# Patient Record
Sex: FEMALE | Race: WHITE | NOT HISPANIC OR LATINO | Employment: OTHER | URBAN - METROPOLITAN AREA
[De-identification: names, ages, dates, MRNs, and addresses within clinical notes are randomized per-mention and may not be internally consistent; named-entity substitution may affect disease eponyms.]

---

## 2017-04-04 ENCOUNTER — CONVERSION ENCOUNTER (OUTPATIENT)
Dept: GENERAL RADIOLOGY | Facility: HOSPITAL | Age: 62
End: 2017-04-04

## 2018-02-19 ENCOUNTER — OFFICE VISIT CONVERTED (OUTPATIENT)
Dept: FAMILY MEDICINE CLINIC | Facility: CLINIC | Age: 63
End: 2018-02-19
Attending: NURSE PRACTITIONER

## 2018-02-19 ENCOUNTER — CONVERSION ENCOUNTER (OUTPATIENT)
Dept: FAMILY MEDICINE CLINIC | Facility: CLINIC | Age: 63
End: 2018-02-19

## 2018-04-11 ENCOUNTER — OFFICE VISIT CONVERTED (OUTPATIENT)
Dept: ORTHOPEDIC SURGERY | Facility: CLINIC | Age: 63
End: 2018-04-11
Attending: ORTHOPAEDIC SURGERY

## 2018-05-16 ENCOUNTER — CONVERSION ENCOUNTER (OUTPATIENT)
Dept: FAMILY MEDICINE CLINIC | Facility: CLINIC | Age: 63
End: 2018-05-16

## 2018-05-16 ENCOUNTER — OFFICE VISIT CONVERTED (OUTPATIENT)
Dept: FAMILY MEDICINE CLINIC | Facility: CLINIC | Age: 63
End: 2018-05-16
Attending: NURSE PRACTITIONER

## 2018-07-10 ENCOUNTER — OFFICE VISIT CONVERTED (OUTPATIENT)
Dept: FAMILY MEDICINE CLINIC | Facility: CLINIC | Age: 63
End: 2018-07-10
Attending: NURSE PRACTITIONER

## 2019-01-02 ENCOUNTER — HOSPITAL ENCOUNTER (OUTPATIENT)
Dept: OTHER | Facility: HOSPITAL | Age: 64
Discharge: HOME | End: 2019-01-02
Attending: NURSE PRACTITIONER

## 2019-01-02 LAB
25(OH)D3 SERPL-MCNC: 36.3 NG/ML (ref 30–100)
ALBUMIN SERPL-MCNC: 4.5 G/DL (ref 3.5–5)
ALBUMIN/GLOB SERPL: 1.7 {RATIO} (ref 1.4–2.6)
ALP SERPL-CCNC: 82 U/L (ref 43–160)
ALT SERPL-CCNC: 24 U/L (ref 10–40)
ANION GAP SERPL CALC-SCNC: 21 MMOL/L (ref 8–19)
AST SERPL-CCNC: 19 U/L (ref 15–50)
BASOPHILS # BLD AUTO: 0.05 10*3/UL (ref 0–0.2)
BASOPHILS NFR BLD AUTO: 0.72 % (ref 0–3)
BILIRUB SERPL-MCNC: 0.24 MG/DL (ref 0.2–1.3)
BUN SERPL-MCNC: 18 MG/DL (ref 5–25)
BUN/CREAT SERPL: 19 {RATIO} (ref 6–20)
CALCIUM SERPL-MCNC: 9.6 MG/DL (ref 8.7–10.4)
CHLORIDE SERPL-SCNC: 106 MMOL/L (ref 99–111)
CHOLEST SERPL-MCNC: 175 MG/DL (ref 107–200)
CHOLEST/HDLC SERPL: 3.8 {RATIO} (ref 3–6)
CONV CO2: 23 MMOL/L (ref 22–32)
CONV TOTAL PROTEIN: 7.2 G/DL (ref 6.3–8.2)
CREAT UR-MCNC: 0.94 MG/DL (ref 0.5–0.9)
EOSINOPHIL # BLD AUTO: 0.21 10*3/UL (ref 0–0.7)
EOSINOPHIL # BLD AUTO: 3.29 % (ref 0–7)
ERYTHROCYTE [DISTWIDTH] IN BLOOD BY AUTOMATED COUNT: 11.9 % (ref 11.5–14.5)
EST. AVERAGE GLUCOSE BLD GHB EST-MCNC: 128 MG/DL
GFR SERPLBLD BASED ON 1.73 SQ M-ARVRAT: >60 ML/MIN/{1.73_M2}
GLOBULIN UR ELPH-MCNC: 2.7 G/DL (ref 2–3.5)
GLUCOSE SERPL-MCNC: 133 MG/DL (ref 65–99)
HBA1C MFR BLD: 15 G/DL (ref 12–16)
HBA1C MFR BLD: 6.1 % (ref 3.5–5.7)
HCT VFR BLD AUTO: 46.8 % (ref 37–47)
HDLC SERPL-MCNC: 46 MG/DL (ref 40–60)
LDLC SERPL CALC-MCNC: 106 MG/DL (ref 70–100)
LYMPHOCYTES # BLD AUTO: 2.11 10*3/UL (ref 1–5)
MCH RBC QN AUTO: 30.1 PG (ref 27–31)
MCHC RBC AUTO-ENTMCNC: 32 G/DL (ref 33–37)
MCV RBC AUTO: 94.3 FL (ref 81–99)
MONOCYTES # BLD AUTO: 0.43 10*3/UL (ref 0.2–1.2)
MONOCYTES NFR BLD AUTO: 6.9 % (ref 3–10)
NEUTROPHILS # BLD AUTO: 3.48 10*3/UL (ref 2–8)
NEUTROPHILS NFR BLD AUTO: 55.5 % (ref 30–85)
NRBC BLD AUTO-RTO: 0 % (ref 0–0.01)
OSMOLALITY SERPL CALC.SUM OF ELEC: 306 MOSM/KG (ref 273–304)
PLATELET # BLD AUTO: 278 10*3/UL (ref 130–400)
PMV BLD AUTO: 8.1 FL (ref 7.4–10.4)
POTASSIUM SERPL-SCNC: 4.2 MMOL/L (ref 3.5–5.3)
RBC # BLD AUTO: 4.96 10*6/UL (ref 4.2–5.4)
SODIUM SERPL-SCNC: 146 MMOL/L (ref 135–147)
T4 FREE SERPL-MCNC: 1.2 NG/DL (ref 0.9–1.8)
TRIGL SERPL-MCNC: 113 MG/DL (ref 40–150)
TSH SERPL-ACNC: 2.27 M[IU]/L (ref 0.27–4.2)
URATE SERPL-MCNC: 8.5 MG/DL (ref 2.5–7.5)
VARIANT LYMPHS NFR BLD MANUAL: 33.6 % (ref 20–45)
VLDLC SERPL-MCNC: 23 MG/DL (ref 5–37)
WBC # BLD AUTO: 6.26 10*3/UL (ref 4.8–10.8)

## 2019-01-03 ENCOUNTER — OFFICE VISIT CONVERTED (OUTPATIENT)
Dept: FAMILY MEDICINE CLINIC | Facility: CLINIC | Age: 64
End: 2019-01-03
Attending: NURSE PRACTITIONER

## 2019-01-03 ENCOUNTER — CONVERSION ENCOUNTER (OUTPATIENT)
Dept: FAMILY MEDICINE CLINIC | Facility: CLINIC | Age: 64
End: 2019-01-03

## 2019-01-31 ENCOUNTER — OFFICE VISIT CONVERTED (OUTPATIENT)
Dept: SURGERY | Facility: CLINIC | Age: 64
End: 2019-01-31
Attending: SURGERY

## 2019-02-11 ENCOUNTER — HOSPITAL ENCOUNTER (OUTPATIENT)
Dept: GASTROENTEROLOGY | Facility: HOSPITAL | Age: 64
Setting detail: HOSPITAL OUTPATIENT SURGERY
Discharge: HOME | End: 2019-02-11
Attending: SURGERY

## 2019-03-04 ENCOUNTER — OFFICE VISIT CONVERTED (OUTPATIENT)
Dept: SURGERY | Facility: CLINIC | Age: 64
End: 2019-03-04
Attending: SURGERY

## 2019-03-06 ENCOUNTER — HOSPITAL ENCOUNTER (OUTPATIENT)
Dept: GENERAL RADIOLOGY | Facility: HOSPITAL | Age: 64
Discharge: HOME | End: 2019-03-06
Attending: NURSE PRACTITIONER

## 2019-07-08 ENCOUNTER — HOSPITAL ENCOUNTER (OUTPATIENT)
Dept: FAMILY MEDICINE CLINIC | Facility: CLINIC | Age: 64
Discharge: HOME | End: 2019-07-08
Attending: NURSE PRACTITIONER

## 2019-07-08 ENCOUNTER — OFFICE VISIT CONVERTED (OUTPATIENT)
Dept: FAMILY MEDICINE CLINIC | Facility: CLINIC | Age: 64
End: 2019-07-08
Attending: NURSE PRACTITIONER

## 2019-07-08 LAB
CONV CREATININE URINE, RANDOM: 20.7 MG/DL (ref 10–300)
CONV MICROALBUM.,U,RANDOM: <12 MG/L (ref 0–20)
MICROALBUMIN/CREAT UR: 58 MG/G{CRE} (ref 0–35)
T4 FREE SERPL-MCNC: 1.2 NG/DL (ref 0.9–1.8)
TSH SERPL-ACNC: 1.42 M[IU]/L (ref 0.27–4.2)

## 2019-07-12 LAB
CONV ANTI MICROSOMAL AB: 12 IU/ML (ref 0–34)
THYROGLOBULIN ANTIBODY: <1 IU/ML (ref 0–0.9)

## 2019-07-16 ENCOUNTER — OFFICE VISIT CONVERTED (OUTPATIENT)
Dept: SURGERY | Facility: CLINIC | Age: 64
End: 2019-07-16
Attending: SURGERY

## 2019-07-16 ENCOUNTER — HOSPITAL ENCOUNTER (OUTPATIENT)
Dept: FAMILY MEDICINE CLINIC | Facility: CLINIC | Age: 64
Discharge: HOME | End: 2019-07-16
Attending: NURSE PRACTITIONER

## 2019-07-16 LAB
ALBUMIN SERPL-MCNC: 4 G/DL (ref 3.5–5)
ALBUMIN/GLOB SERPL: 1.5 {RATIO} (ref 1.4–2.6)
ALP SERPL-CCNC: 84 U/L (ref 43–160)
ALT SERPL-CCNC: 18 U/L (ref 10–40)
ANION GAP SERPL CALC-SCNC: 20 MMOL/L (ref 8–19)
AST SERPL-CCNC: 20 U/L (ref 15–50)
BASOPHILS # BLD AUTO: 0.03 10*3/UL (ref 0–0.2)
BASOPHILS NFR BLD AUTO: 0.6 % (ref 0–3)
BILIRUB SERPL-MCNC: 0.28 MG/DL (ref 0.2–1.3)
BUN SERPL-MCNC: 21 MG/DL (ref 5–25)
BUN/CREAT SERPL: 23 {RATIO} (ref 6–20)
CALCIUM SERPL-MCNC: 9.3 MG/DL (ref 8.7–10.4)
CHLORIDE SERPL-SCNC: 103 MMOL/L (ref 99–111)
CONV ABS IMM GRAN: 0.01 10*3/UL (ref 0–0.2)
CONV CO2: 24 MMOL/L (ref 22–32)
CONV IMMATURE GRAN: 0.2 % (ref 0–1.8)
CONV TOTAL PROTEIN: 6.6 G/DL (ref 6.3–8.2)
CREAT UR-MCNC: 0.92 MG/DL (ref 0.5–0.9)
DEPRECATED RDW RBC AUTO: 45.7 FL (ref 36.4–46.3)
EOSINOPHIL # BLD AUTO: 0.14 10*3/UL (ref 0–0.7)
EOSINOPHIL # BLD AUTO: 2.6 % (ref 0–7)
ERYTHROCYTE [DISTWIDTH] IN BLOOD BY AUTOMATED COUNT: 13.5 % (ref 11.7–14.4)
EST. AVERAGE GLUCOSE BLD GHB EST-MCNC: 134 MG/DL
GFR SERPLBLD BASED ON 1.73 SQ M-ARVRAT: >60 ML/MIN/{1.73_M2}
GLOBULIN UR ELPH-MCNC: 2.6 G/DL (ref 2–3.5)
GLUCOSE SERPL-MCNC: 106 MG/DL (ref 65–99)
HBA1C MFR BLD: 13.9 G/DL (ref 12–16)
HBA1C MFR BLD: 6.3 % (ref 3.5–5.7)
HCT VFR BLD AUTO: 44 % (ref 37–47)
LYMPHOCYTES # BLD AUTO: 1.49 10*3/UL (ref 1–5)
MCH RBC QN AUTO: 29.4 PG (ref 27–31)
MCHC RBC AUTO-ENTMCNC: 31.6 G/DL (ref 33–37)
MCV RBC AUTO: 93 FL (ref 81–99)
MONOCYTES # BLD AUTO: 0.37 10*3/UL (ref 0.2–1.2)
MONOCYTES NFR BLD AUTO: 7 % (ref 3–10)
NEUTROPHILS # BLD AUTO: 3.25 10*3/UL (ref 2–8)
NEUTROPHILS NFR BLD AUTO: 61.4 % (ref 30–85)
NRBC CBCN: 0 % (ref 0–0.7)
OSMOLALITY SERPL CALC.SUM OF ELEC: 297 MOSM/KG (ref 273–304)
PLATELET # BLD AUTO: 265 10*3/UL (ref 130–400)
PMV BLD AUTO: 11 FL (ref 9.4–12.3)
POTASSIUM SERPL-SCNC: 4.7 MMOL/L (ref 3.5–5.3)
RBC # BLD AUTO: 4.73 10*6/UL (ref 4.2–5.4)
SODIUM SERPL-SCNC: 142 MMOL/L (ref 135–147)
VARIANT LYMPHS NFR BLD MANUAL: 28.2 % (ref 20–45)
WBC # BLD AUTO: 5.29 10*3/UL (ref 4.8–10.8)

## 2019-08-14 ENCOUNTER — HOSPITAL ENCOUNTER (OUTPATIENT)
Dept: GASTROENTEROLOGY | Facility: HOSPITAL | Age: 64
Setting detail: HOSPITAL OUTPATIENT SURGERY
Discharge: HOME | End: 2019-08-14
Attending: SURGERY

## 2019-08-14 LAB — GLUCOSE BLD-MCNC: 126 MG/DL (ref 65–99)

## 2019-12-09 ENCOUNTER — HOSPITAL ENCOUNTER (OUTPATIENT)
Dept: FAMILY MEDICINE CLINIC | Facility: CLINIC | Age: 64
Discharge: HOME | End: 2019-12-09
Attending: NURSE PRACTITIONER

## 2019-12-09 ENCOUNTER — OFFICE VISIT CONVERTED (OUTPATIENT)
Dept: FAMILY MEDICINE CLINIC | Facility: CLINIC | Age: 64
End: 2019-12-09
Attending: NURSE PRACTITIONER

## 2019-12-09 LAB
BASOPHILS # BLD AUTO: 0.06 10*3/UL (ref 0–0.2)
BASOPHILS NFR BLD AUTO: 0.7 % (ref 0–3)
CONV ABS IMM GRAN: 0.02 10*3/UL (ref 0–0.2)
CONV IMMATURE GRAN: 0.2 % (ref 0–1.8)
DEPRECATED RDW RBC AUTO: 43.4 FL (ref 36.4–46.3)
EOSINOPHIL # BLD AUTO: 0.23 10*3/UL (ref 0–0.7)
EOSINOPHIL # BLD AUTO: 2.6 % (ref 0–7)
ERYTHROCYTE [DISTWIDTH] IN BLOOD BY AUTOMATED COUNT: 12.9 % (ref 11.7–14.4)
HCT VFR BLD AUTO: 45.3 % (ref 37–47)
HGB BLD-MCNC: 14.2 G/DL (ref 12–16)
LYMPHOCYTES # BLD AUTO: 1.3 10*3/UL (ref 1–5)
LYMPHOCYTES NFR BLD AUTO: 14.5 % (ref 20–45)
MCH RBC QN AUTO: 28.7 PG (ref 27–31)
MCHC RBC AUTO-ENTMCNC: 31.3 G/DL (ref 33–37)
MCV RBC AUTO: 91.7 FL (ref 81–99)
MONOCYTES # BLD AUTO: 0.76 10*3/UL (ref 0.2–1.2)
MONOCYTES NFR BLD AUTO: 8.5 % (ref 3–10)
NEUTROPHILS # BLD AUTO: 6.59 10*3/UL (ref 2–8)
NEUTROPHILS NFR BLD AUTO: 73.5 % (ref 30–85)
NRBC CBCN: 0 % (ref 0–0.7)
PLATELET # BLD AUTO: 244 10*3/UL (ref 130–400)
PMV BLD AUTO: 10.6 FL (ref 9.4–12.3)
RBC # BLD AUTO: 4.94 10*6/UL (ref 4.2–5.4)
WBC # BLD AUTO: 8.96 10*3/UL (ref 4.8–10.8)

## 2020-04-01 ENCOUNTER — CONVERSION ENCOUNTER (OUTPATIENT)
Dept: FAMILY MEDICINE CLINIC | Facility: CLINIC | Age: 65
End: 2020-04-01

## 2020-09-01 ENCOUNTER — HOSPITAL ENCOUNTER (OUTPATIENT)
Dept: FAMILY MEDICINE CLINIC | Facility: CLINIC | Age: 65
Discharge: HOME | End: 2020-09-01
Attending: NURSE PRACTITIONER

## 2020-09-01 ENCOUNTER — OFFICE VISIT CONVERTED (OUTPATIENT)
Dept: FAMILY MEDICINE CLINIC | Facility: CLINIC | Age: 65
End: 2020-09-01
Attending: NURSE PRACTITIONER

## 2020-09-01 LAB
EST. AVERAGE GLUCOSE BLD GHB EST-MCNC: 143 MG/DL
HBA1C MFR BLD: 6.6 % (ref 3.5–5.7)

## 2020-09-02 LAB
ALBUMIN SERPL-MCNC: 4.6 G/DL (ref 3.5–5)
ALBUMIN/GLOB SERPL: 1.5 {RATIO} (ref 1.4–2.6)
ALP SERPL-CCNC: 115 U/L (ref 43–160)
ALT SERPL-CCNC: 29 U/L (ref 10–40)
ANION GAP SERPL CALC-SCNC: 18 MMOL/L (ref 8–19)
AST SERPL-CCNC: 23 U/L (ref 15–50)
BASOPHILS # BLD AUTO: 0.04 10*3/UL (ref 0–0.2)
BASOPHILS NFR BLD AUTO: 0.7 % (ref 0–3)
BILIRUB SERPL-MCNC: 0.25 MG/DL (ref 0.2–1.3)
BUN SERPL-MCNC: 21 MG/DL (ref 5–25)
BUN/CREAT SERPL: 22 {RATIO} (ref 6–20)
CALCIUM SERPL-MCNC: 10.2 MG/DL (ref 8.7–10.4)
CHLORIDE SERPL-SCNC: 103 MMOL/L (ref 99–111)
CHOLEST SERPL-MCNC: 186 MG/DL (ref 107–200)
CHOLEST/HDLC SERPL: 4 {RATIO} (ref 3–6)
CONV ABS IMM GRAN: 0.02 10*3/UL (ref 0–0.2)
CONV CO2: 24 MMOL/L (ref 22–32)
CONV IMMATURE GRAN: 0.3 % (ref 0–1.8)
CONV TOTAL PROTEIN: 7.6 G/DL (ref 6.3–8.2)
CREAT UR-MCNC: 0.95 MG/DL (ref 0.5–0.9)
DEPRECATED RDW RBC AUTO: 45.8 FL (ref 36.4–46.3)
EOSINOPHIL # BLD AUTO: 0.19 10*3/UL (ref 0–0.7)
EOSINOPHIL # BLD AUTO: 3.2 % (ref 0–7)
ERYTHROCYTE [DISTWIDTH] IN BLOOD BY AUTOMATED COUNT: 13.5 % (ref 11.7–14.4)
GFR SERPLBLD BASED ON 1.73 SQ M-ARVRAT: >60 ML/MIN/{1.73_M2}
GLOBULIN UR ELPH-MCNC: 3 G/DL (ref 2–3.5)
GLUCOSE SERPL-MCNC: 113 MG/DL (ref 65–99)
HCT VFR BLD AUTO: 49.2 % (ref 37–47)
HDLC SERPL-MCNC: 46 MG/DL (ref 40–60)
HGB BLD-MCNC: 15.3 G/DL (ref 12–16)
LDLC SERPL CALC-MCNC: 114 MG/DL (ref 70–100)
LYMPHOCYTES # BLD AUTO: 1.93 10*3/UL (ref 1–5)
LYMPHOCYTES NFR BLD AUTO: 33 % (ref 20–45)
MCH RBC QN AUTO: 28.5 PG (ref 27–31)
MCHC RBC AUTO-ENTMCNC: 31.1 G/DL (ref 33–37)
MCV RBC AUTO: 91.6 FL (ref 81–99)
MONOCYTES # BLD AUTO: 0.41 10*3/UL (ref 0.2–1.2)
MONOCYTES NFR BLD AUTO: 7 % (ref 3–10)
NEUTROPHILS # BLD AUTO: 3.26 10*3/UL (ref 2–8)
NEUTROPHILS NFR BLD AUTO: 55.8 % (ref 30–85)
NRBC CBCN: 0 % (ref 0–0.7)
OSMOLALITY SERPL CALC.SUM OF ELEC: 294 MOSM/KG (ref 273–304)
PLATELET # BLD AUTO: 298 10*3/UL (ref 130–400)
PMV BLD AUTO: 10.7 FL (ref 9.4–12.3)
POTASSIUM SERPL-SCNC: 4.7 MMOL/L (ref 3.5–5.3)
RBC # BLD AUTO: 5.37 10*6/UL (ref 4.2–5.4)
SODIUM SERPL-SCNC: 140 MMOL/L (ref 135–147)
TRIGL SERPL-MCNC: 131 MG/DL (ref 40–150)
TSH SERPL-ACNC: 1 M[IU]/L (ref 0.27–4.2)
VLDLC SERPL-MCNC: 26 MG/DL (ref 5–37)
WBC # BLD AUTO: 5.85 10*3/UL (ref 4.8–10.8)

## 2020-09-10 ENCOUNTER — TELEPHONE CONVERTED (OUTPATIENT)
Dept: FAMILY MEDICINE CLINIC | Facility: CLINIC | Age: 65
End: 2020-09-10
Attending: NURSE PRACTITIONER

## 2020-10-07 ENCOUNTER — HOSPITAL ENCOUNTER (OUTPATIENT)
Dept: URGENT CARE | Facility: CLINIC | Age: 65
Discharge: HOME | End: 2020-10-07
Attending: EMERGENCY MEDICINE

## 2020-12-23 ENCOUNTER — TELEMEDICINE CONVERTED (OUTPATIENT)
Dept: FAMILY MEDICINE CLINIC | Facility: CLINIC | Age: 65
End: 2020-12-23
Attending: NURSE PRACTITIONER

## 2021-05-13 NOTE — PROGRESS NOTES
"   Progress Note      Patient Name: Abdiaziz Krishna   Patient ID: [id number]   Sex: Female   YOB: 1955    Primary Care Provider: Anahi SINGLETARY   Referring Provider: Anahi SINGLETARY    Visit Date: September 1, 2020    Provider: GEMINI Camarena   Location: [facility]   Location Address: [address] [place] [address]  [id number]   Location Phone: [phone]          Chief Complaint  · Lab work   · Anxiety/depression      History Of Present Illness  Abdiaziz Krishna is a 65 year old /White female who presents for evaluation and treatment of:        anxiety and depression, she is on hydroxyzine, she had nausea with the paxil. She couldn't tolerate trintellix for the nausea. She feels panicky and irritiable when she is in public places. She is not feeling herself. Its been worse since covid started. She has had thoughts that she would be better off if she weren't here, but has never come up with a plan. She feels bad for her family that they have to \"put up with her.\"     She scores a 24 on the phq-9.    She declines mammo  She declines bone density.    She had colonoscopy in 2019 and was cleared for 10 years.    She denies any recent falls.    She went to complete family care in [place] a few weeks ago for ear pain.  They told her to take an antihistamine, she still feels fullness especially in the right ear       Past Medical History  Disease Name Date Onset Notes   Allergic rhinitis --  --    Arthritis --  --    Asthma --  --    Broken Bones 1999,2013 07/29/2015 - left ankle and right leg    Bronchitis --  --    Depression --  --    Diabetes --  --    Diabetes Mellitus, Type II --  --    Gall Stones 1998 07/29/2015 -    Hyperlipidemia --  --    Hypertension --  --    Incomplete tear of left rotator cuff 04/13/2018 --    Otitis Media, Unspecified --  --    Plantar Fasciitis --  --    Reflux --  --    Reflux Disease --  --    Seasonal " allergies --  --    Sinusitis, acute --  --    Tendinopathy of rotator cuff, left 2018 --    Thyroid disorder --  --    Thyroid Problems 1978 2015 -          Past Surgical History  Procedure Name Date Notes   Ankle surgery  Left ankle fracture   Breast augmentation surgery, bilateral --  --    Cholecystectomy  --    Colonoscopy --  --    Fracture/Broken Bone(s)  Right leg fracture   Hysterectomy  --    Leg Surgery --  --    Metal implants --  --    Partial thyroidectomy  goiter 85% removed   Tubal ligation  --          Medication List  Name Date Started Instructions   hydroxyzine HCl 50 mg oral tablet 2020 one three times a day prn   Saxenda 3 mg/0.5 mL (18 mg/3 mL) subcutaneous pen injector 2020 inject 3 mg by subcutaneous route once daily in the abdomen, thigh, or upper arm         Allergy List  Allergen Name Date Reaction Notes   NO KNOWN DRUG ALLERGIES --  --  --        Allergies Reconciled  Family Medical History  Disease Name Relative/Age Notes   Emphysema Mother/   --    Heart Disease Father/   Father   Congestive Heart Failure Mother/    80yrs old   Diabetes, unspecified type Daughter/  Father/   Father; Daughter   Aneurysm Father/    age 88 abdominal aneurysm   Family history of certain chronic disabling diseases; arthritis Mother/   Mother         Reproductive History  Menstrual   Pregnancy Summary   Total Pregnancies: 2 Full Term: 0 Premature: 0   Ab Induced: 0 Ab Spontaneous: 0 Ectopics: 0   Multiples: 0 Livin         Social History  Finding Status Start/Stop Quantity Notes   Alcohol Former --/-- --  2015 - was heavy social drinker for years,now rare for last few years    Alcohol Use Never --/-- --  does not drink   Cook --  --/-- --  2015 - [employer]   Exercises regularly --  --/-- --  walks   lives with spouse --  --/-- --  --     --  --/-- --  --    . --  --/-- --  --    Recreational Drug Use Never  "--/-- --  no   Tobacco Former --/-- 2ppd used 35-40yrs quit 4 yrs ago   Working --  --/-- --  --          Immunizations  NameDate Admin Mfg Trade Name Lot Number Route Inj VIS Given VIS Publication   Hepatitis A10/01/2018 SKB HAVRIX-ADULT  [facility] 01/03/2019 10/25/2011   Comments:    Hepatitis A04/01/2018 SK HAVRIX-ADULT  [facility] 01/03/2019 10/25/2011   Comments:    Ygbvpfqzd64/01/2018 SKB Fluarix, quadrivalent, preservative free 2A2KX [facility] 01/03/2019 08/07/2015   Comments:    Nzuhfrzib34/25/2015 SKB Fluarix, quadrivalent, preservative free 2A2KX [facility] 10/19/2015 07/02/2012   Comments:          Review of Systems  · Constitutional  o Admits  o : fatigue  · HENT  o Admits  o : ear fullness, sinus congestion  · Cardiovascular  o Denies  o : lower extremity edema, chest pressure, palpitations  · Respiratory  o Denies  o : shortness of breath, wheezing, cough, dyspnea on exertion  · Gastrointestinal  o Denies  o : vomiting, diarrhea, constipation, abdominal pain  · Psychiatric  o Admits  o : anxiety, depression  o Denies  o : suicidal ideation, homicidal ideation      Vitals  Date Time BP Position Site L\R Cuff Size HR RR TEMP (F) WT  HT  BMI kg/m2 BSA m2 O2 Sat        04/01/2020 09:59 /79 Sitting    86 - R   226lbs 2oz 5'  9\" 33.39 2.23 96 %    04/01/2020 10:00 /74 Sitting               09/01/2020 10:32 /75 Sitting    82 - R 20 97.2 221lbs 4oz 5'  9\" 32.67 2.21 96 %          Physical Examination  · Constitutional  o Appearance  o : well developed, well-nourished, no acute distress, ill appearing  · Ears, Nose, Mouth and Throat  o Ears  o :   § External Ears  § : external auditory canal appearance normal, no discharge present  § Otoscopic Examination  § : tympanic membranes bulging  o Nose  o :   § External Nose  § : no lesions noted  § Intranasal Exam  § : nasal mucosa edematous  § Nasopharynx  § : no discharge present  o Oral Cavity  o :   § Oral Mucosa  § : oral mucosa light pink  o Throat  o : "   § Oropharynx  § : tonsils without exudate, no palatal petechiae  · Neck  o Inspection/Palpation  o : normal appearance, no masses or tenderness, trachea midline  o Thyroid  o : gland size normal, nontender, no nodules or masses present on palpation  · Respiratory  o Respiratory Effort  o : breathing unlabored  o Inspection of Chest  o : chest rise symmetric bilaterally  o Auscultation of Lungs  o : clear to auscultation bilaterally throughout inspiration and expiration  · Cardiovascular  o Heart  o :   § Auscultation of Heart  § : regular rate and rhythm, no murmurs, gallops or rubs  o Peripheral Vascular System  o :   § Extremities  § : no edema  · Lymphatic  o Neck  o : no cervical lymphadenopathy, no supraclavicular lymphadenopathy  · Psychiatric  o Mood and Affect  o : mood normal, affect appropriate  o Presence of Abnormal Thoughts  o : no hallucinations, no homicidal ideation, no suicidal ideation              Assessment  · Allergic rhinitis due to allergen     477.9/J30.9  · Anxiety disorder     300.00/F41.9  · Dermatitis     692.9/L30.9  · GERD (gastroesophageal reflux disease)     530.81/K21.9  · Screening for depression     V79.0/Z13.89  · ETD (eustachian tube dysfunction)     381.81/H69.80      Plan  · Orders  o Annual depression screening, 15 minutes (02168, ) - V79.0/Z13.89 - 09/01/2020  o ACO-18: Positive screen for clinical depression using a standardized tool and a follow-up plan documented () - V79.0/Z13.89 - 09/01/2020  o Hgb A1c [facility] (42851) - - 09/01/2020  o Physical, Primary Care Panel (CBC, CMP, Lipid, TSH) [facility] (63095, 86949, 13795, 06484) - - 09/01/2020  o ACO-39: Current medications updated and reviewed () - - 09/01/2020  o ACO-14: Influenza immunization was not administered for reasons documented () - - 09/01/2020  o ACO-19: Colorectal cancer screening results documented and reviewed (3017F) - - 09/01/2020 2/11/2019  o ACO-13: Fall Risk Screening with no falls in past  "year or only one fall without injury in the past year (1101F) - - 09/01/2020  o ACO - Pt declines to or was not able to provide an Advance Care Plan or name a Surrogate Decision Maker (1124F) - - 09/01/2020  · Medications  o Cymbalta 30 mg oral capsule,delayed release(DR/EC)   SIG: take 1 capsule (30 mg) by oral route once daily for 30 days   DISP: (30) capsules with 2 refills  Prescribed on 09/01/2020     o Flonase Allergy Relief 50 mcg/actuation nasal spray,suspension   SIG: spray 2 sprays (100 mcg) in each nostril by intranasal route once daily as needed   DISP: (1) 9.9 ml aer w/adap with 2 refills  Prescribed on 09/01/2020     o Medications have been Reconciled  o Transition of Care or Provider Policy  · Instructions  o Discussed the need for therapy, either with a certified counselor, psychologist, and/or family . If no improvement is noted or worsening of their condition, return to office or ER. But also discussed with patient that if they are non-responsive to the type of medication they may need to see a psychiatrist for further evaluation and management.  o Patient was given an SSRI/SSNRI medication and warned of possible side effects of the medication including potential for increased risk of suicidal thoughts and feelings. Patient was instructed that if they begin to exhibit any of these effects they will discontinue the medication immediately and contact our office or the ER ASAP.  o Patient agrees to a \"No Self Harm\" contract. Patient will either call us, [facility], ER, [facility], [facility].  o Maintain a healthy weight. Avoid tight fitting clothes. Avoid fried, fatty foods, tomato sauce, chocolate, mint, garlic, onion, alcohol. caffeine. Eat smaller meals, dont lie down after a meal, dont smoke. Elevate the head of your bed 6-9 inches.  o Depression Screen completed and scanned into the EMR under the designated folder within the patient's documents.  o Today's PHQ-9 " result is _24__  o The provider screening met the required time of 15 minutes.  o Take all medications as prescribed/directed.  o Rest. Increase Fluids.  o Patient was educated/instructed on their diagnosis, treatment and medications prior to discharge from the clinic today.  o Patient instructed to seek medical attention urgently for new or worsening symptoms.  o Call the office with any concerns or questions.  o Minutes spent with patient including greater than 50% in Education/Counseling/Care Coordination.  o Time spent with the patient was minutes, more than 50% face to face.  o Chronic conditions reviewed and taken into consideration for today's treatment plan.  o phone f/u in 6 weeks, sooner if needed  · Disposition  o Call or Return if symptoms worsen or persist.  o F/U 6 weeks            Electronically Signed by: Jackie Arteaga APRN -Author on September 1, 2020 01:16:16 PM

## 2021-05-13 NOTE — PROGRESS NOTES
Quick Note      Patient Name: Abdiaziz Krishna   Patient ID: [id number]   Sex: Female   YOB: 1955    Primary Care Provider: Anahi SINGLETARY   Referring Provider: Anahi SINGLETARY    Visit Date: September 10, 2020    Provider: GEMINI Camarena   Location: [facility]   Location Address: [address] [place] [address]  [id number]   Location Phone: [phone]          History Of Present Illness  TELEHEALTH TELEPHONE VISIT  Abdiaziz Krishna is a 65 year old /White female who is presenting for evaluation via telehealth telephone visit. Verbal consent obtained before beginning visit.   Provider spent 11 minutes with patient during telehealth visit.   The following staff were present during this visit: AT/CMA   Past Medical History/Overview of Patient Symptoms  Abdiaziz Krishna is a 65 year old /White female who presents for evaluation and treatment of:      PHONE CALL SATRTED AT 2:32  Discuss lab results    type 2 dm, she has been on metformin before, she is currently trying to lose weight. Her aic is up to 6.6. She hasn't lost a significant amt of weight since starting the saxenda. She is on a keto diet already.     HLP- LDL is 114 but total was normal.     Anxiety/depression, she has been on cymbalta for a week, she has had some nausea but otherwise is tolerating ok. She can't see a big difference either way.           Assessment  · Diabetes mellitus, type 2     250.00/E11.9  · Hyperlipidemia     272.4/E78.5  · Obesity     278.00/E66.9  · Anxiety and depression       Anxiety disorder, unspecified     300.00/F41.9  Major depressive disorder, single episode, unspecified     300.00/F32.9    Problems Reconciled  Plan  · Orders  o CMP [facility] (42808) - - 12/10/2020  o Hgb A1c [facility] (56094) - - 12/10/2020  o Lipid Panel [facility] (64575) - - 12/10/2020  o ACO-39: Current medications updated and reviewed () - - 09/10/2020  o ACO-14: Influenza  immunization administered or previously received () - - 09/10/2020  o Physician Telephone Evaluation, 11-20 minutes (00147) - - 09/10/2020  · Medications  o Medications have been Reconciled  o Transition of Care or Provider Policy  · Instructions  o Daily foot care. Avoid walking barefoot. Annual Dilated Eye Exam.  o Discussed with patient blood pressure monitoring, hemoglobin A1C levels need to be below 7.0, and LDL (Lipid) goals below 70.  o Advised that cheeses and other sources of dairy fats, animal fats, fast food, and the extras (candy, pastries, pies, doughnuts and cookies) all contain LDL raising nutrients. Advised to increase fruits, vegetables, whole grains, and to monitor portion sizes.   o Plan Of Care: she will call back to schedule appt and will do labs in 3 months to re eval. she is going ot work on weight loss in the meantime.   o Take all medications as prescribed/directed.  o Call the office with any concerns or questions.  · Disposition  o Call or Return if symptoms worsen or persist.  o F/u appt in 3 months            Electronically Signed by: GEMINI Camarena -Author on September 10, 2020 03:00:35 PM

## 2021-05-14 VITALS
SYSTOLIC BLOOD PRESSURE: 135 MMHG | HEART RATE: 82 BPM | DIASTOLIC BLOOD PRESSURE: 75 MMHG | TEMPERATURE: 97.2 F | HEIGHT: 69 IN | RESPIRATION RATE: 20 BRPM | BODY MASS INDEX: 32.77 KG/M2 | OXYGEN SATURATION: 96 % | WEIGHT: 221.25 LBS

## 2021-05-14 NOTE — PROGRESS NOTES
"   Progress Note      Patient Name: Abdiaziz Krishna   Patient ID: [id number]   Sex: Female   YOB: 1955    Primary Care Provider: Anahi SINGLETARY   Referring Provider: Anahi SINGLETARY    Visit Date: December 23, 2020    Provider: GEMINI James   Location: [facility]   Location Address: [address] [place] [address]  [id number]   Location Phone: [phone]          History Of Present Illness  Video Conferencing Visit  Abdiaziz Krishna is a 65 year old /White female who is presenting for evaluation via video conferencing via [employer]. Verbal consent obtained before beginning visit.   The following staff were present during this visit: pt, kCW   Abdiaziz Krishna is a 65 year old /White female who presents for evaluation and treatment of:      She has had a sore throat for about 1 week, achy ears.  No fever.  She has had headache that she normally doesn't have.  She has antihistamine and nasal spray.  She has not been around anyone with covid.  She feels she had covid a year ago.  She is not having any coughing it is just \"in my head pressure\".       Past Medical History  Disease Name Date Onset Notes   Allergic rhinitis --  --    Arthritis --  --    Asthma --  --    Broken Bones 1999,2013 07/29/2015 - left ankle and right leg    Bronchitis --  --    Depression --  --    Diabetes --  --    Diabetes Mellitus, Type II --  --    Gall Stones 1998 07/29/2015 -    Hyperlipidemia --  --    Hypertension --  --    Incomplete tear of left rotator cuff 04/13/2018 --    Otitis Media, Unspecified --  --    Plantar Fasciitis --  --    Reflux --  --    Reflux Disease --  --    Seasonal allergies --  --    Sinusitis, acute --  --    Tendinopathy of rotator cuff, left 04/13/2018 --    Thyroid disorder --  --    Thyroid Problems 1978 07/29/2015 -          Past Surgical History  Procedure Name Date Notes   Ankle surgery 2012 Left ankle fracture "   Breast augmentation surgery, bilateral --  --    Cholecystectomy  --    Colonoscopy --  --    Fracture/Broken Bone(s)  Right leg fracture   Hysterectomy  --    Leg Surgery --  --    Metal implants --  --    Partial thyroidectomy  goiter 85% removed   Tubal ligation  --          Medication List  Name Date Started Instructions   Cymbalta 30 mg oral capsule,delayed release(DR/EC) 2020 take 1 capsule (30 mg) by oral route once daily for 30 days   Flonase Allergy Relief 50 mcg/actuation nasal spray,suspension 2020 spray 2 sprays (100 mcg) in each nostril by intranasal route once daily as needed   hydroxyzine HCl 50 mg oral tablet 2020 one three times a day prn   Saxenda 3 mg/0.5 mL (18 mg/3 mL) subcutaneous pen injector 2020 inject 3 mg by subcutaneous route once daily in the abdomen, thigh, or upper arm         Allergy List  Allergen Name Date Reaction Notes   NO KNOWN DRUG ALLERGIES --  --  --        Allergies Reconciled  Family Medical History  Disease Name Relative/Age Notes   Emphysema Mother/   --    Heart Disease Father/   Father   Congestive Heart Failure Mother/    80yrs old   Diabetes, unspecified type Daughter/  Father/   Father; Daughter   Aneurysm Father/    age 88 abdominal aneurysm   Family history of certain chronic disabling diseases; arthritis Mother/   Mother         Reproductive History  Menstrual   Pregnancy Summary   Total Pregnancies: 2 Full Term: 0 Premature: 0   Ab Induced: 0 Ab Spontaneous: 0 Ectopics: 0   Multiples: 0 Livin         Social History  Finding Status Start/Stop Quantity Notes   Alcohol Former --/-- --  2015 - was heavy social drinker for years,now rare for last few years    Alcohol Use Never --/-- --  does not drink   Cook --  --/-- --  2015 - [employer]   Exercises regularly --  --/-- --  walks   lives with spouse --  --/-- --  --     --  --/-- --  --    . --  --/-- --  --     Recreational Drug Use Never --/-- --  no   Tobacco Former --/-- 2ppd used 35-40yrs quit 4 yrs ago   Working --  --/-- --  --          Immunizations  NameDate Admin Mfg Trade Name Lot Number Route Inj VIS Given VIS Publication   Hepatitis A10/01/2018 SKB HAVRIX-ADULT  [facility] 01/03/2019 10/25/2011   Comments:    Hepatitis A04/01/2018 [facility] HAVRIX-ADULT  [facility] 01/03/2019 10/25/2011   Comments:    Ovyelnctd62/01/2018 [facility] Fluarix, quadrivalent, preservative free 2A2KX [facility] 01/03/2019 08/07/2015   Comments:          Review of Systems  · Constitutional  o Denies  o : fever, fatigue, weight loss, weight gain  · HENT  o Admits  o : headaches, nasal congestion, sore throat  · Cardiovascular  o Denies  o : lower extremity edema, claudication, chest pressure, palpitations  · Respiratory  o Denies  o : shortness of breath, wheezing, cough, hemoptysis, dyspnea on exertion  · Gastrointestinal  o Denies  o : nausea, vomiting, diarrhea, constipation, abdominal pain      Physical Examination  · Constitutional  o Appearance  o : well-nourished, well developed, alert, in no acute distress  · Respiratory  o Respiratory Effort  o : breathing unlabored  · Skin and Subcutaneous Tissue  o General Inspection  o : no rashes present, no lesions present, no areas of discoloration  · Neurologic  o Mental Status Examination  o :   § Orientation  § : grossly oriented to person, place and time  o Cranial Nerves  o : cranial nerves intact and symmetric throughout  · Psychiatric  o Mood and Affect  o : mood normal, affect appropriate, denies any SI/HI          Assessment  · URI (upper respiratory infection)     465.9/J06.9  · Sore throat     462/J02.9      Plan  · Orders  o ACO-39: Current medications updated and reviewed (1159F, ) - - 12/23/2020  o ACO-14: Influenza immunization administered or previously received [facility] () - - 12/23/2020   pharmacy  · Medications  o Protonix 40 mg oral tablet,delayed release (DR/EC)   SIG: take 1 tablet (40  mg) by oral route once daily   DISP: (1) Tablet with 0 refills  Prescribed on 2020     o prednisone 20 mg oral tablet   SI tab po TID for 3 days1 tab po BID for 3 days1 tab po Qday for 3 days   DISP: (18) Tablet with 0 refills  Prescribed on 2020     o Zithromax Z-Adrien 250 mg oral tablet   SIG: take 2 tablets (500 mg) by oral route once daily for 1 day then 1 tablet (250 mg) by oral route once daily for 4 days   DISP: (6) Tablet with 0 refills  Prescribed on 2020     o Saxenda 3 mg/0.5 mL (18 mg/3 mL) subcutaneous pen injector   SIG: inject 3 mg by subcutaneous route once daily in the abdomen, thigh, or upper arm   DISP: (5) Milliliter with 0 refills  Discontinued on 2020     o Medications have been Reconciled  o Transition of Care or Provider Policy  · Instructions  o Patient was educated/instructed on their diagnosis, treatment and medications prior to discharge from the clinic today.  o Patient instructed to seek medical attention urgently for new or worsening symptoms.  o Patient given paper scripts today.  o We will do steroids and abx. If not better in 5-7 days let me know. Drink plenty of fluids. Discussed about covid and she refuses a test. She knows she should stay in for 7-10 days and she has been doing that for the most part per pt.  · Disposition  o Call or Return if symptoms worsen or persist.            Electronically Signed by: GEMINI James -Author on 2020 08:17:55 AM

## 2021-05-15 VITALS
TEMPERATURE: 98.1 F | DIASTOLIC BLOOD PRESSURE: 67 MMHG | OXYGEN SATURATION: 98 % | BODY MASS INDEX: 29.81 KG/M2 | HEART RATE: 61 BPM | SYSTOLIC BLOOD PRESSURE: 135 MMHG | HEIGHT: 69 IN | WEIGHT: 201.25 LBS | RESPIRATION RATE: 16 BRPM

## 2021-05-15 VITALS
SYSTOLIC BLOOD PRESSURE: 140 MMHG | BODY MASS INDEX: 33.49 KG/M2 | HEIGHT: 69 IN | DIASTOLIC BLOOD PRESSURE: 74 MMHG | SYSTOLIC BLOOD PRESSURE: 148 MMHG | OXYGEN SATURATION: 96 % | DIASTOLIC BLOOD PRESSURE: 79 MMHG | WEIGHT: 226.12 LBS | HEART RATE: 86 BPM

## 2021-05-15 VITALS
WEIGHT: 213.25 LBS | HEART RATE: 92 BPM | OXYGEN SATURATION: 98 % | SYSTOLIC BLOOD PRESSURE: 144 MMHG | TEMPERATURE: 99.1 F | DIASTOLIC BLOOD PRESSURE: 82 MMHG | RESPIRATION RATE: 14 BRPM | HEIGHT: 69 IN | BODY MASS INDEX: 31.59 KG/M2

## 2021-05-15 VITALS — WEIGHT: 200.56 LBS | BODY MASS INDEX: 29.7 KG/M2 | HEIGHT: 69 IN | RESPIRATION RATE: 16 BRPM

## 2021-05-16 VITALS
TEMPERATURE: 97.3 F | HEIGHT: 69 IN | OXYGEN SATURATION: 96 % | SYSTOLIC BLOOD PRESSURE: 160 MMHG | WEIGHT: 211.44 LBS | RESPIRATION RATE: 12 BRPM | BODY MASS INDEX: 31.32 KG/M2 | SYSTOLIC BLOOD PRESSURE: 160 MMHG | HEART RATE: 70 BPM | DIASTOLIC BLOOD PRESSURE: 81 MMHG | DIASTOLIC BLOOD PRESSURE: 81 MMHG

## 2021-05-16 VITALS
HEIGHT: 69 IN | WEIGHT: 225.25 LBS | HEART RATE: 79 BPM | BODY MASS INDEX: 33.36 KG/M2 | DIASTOLIC BLOOD PRESSURE: 65 MMHG | RESPIRATION RATE: 20 BRPM | SYSTOLIC BLOOD PRESSURE: 128 MMHG | DIASTOLIC BLOOD PRESSURE: 67 MMHG | HEART RATE: 87 BPM | TEMPERATURE: 98 F | SYSTOLIC BLOOD PRESSURE: 128 MMHG | OXYGEN SATURATION: 98 %

## 2021-05-16 VITALS
DIASTOLIC BLOOD PRESSURE: 80 MMHG | BODY MASS INDEX: 32.2 KG/M2 | WEIGHT: 217.37 LBS | RESPIRATION RATE: 12 BRPM | HEART RATE: 93 BPM | HEIGHT: 69 IN | OXYGEN SATURATION: 97 % | SYSTOLIC BLOOD PRESSURE: 152 MMHG | TEMPERATURE: 98.9 F

## 2021-05-16 VITALS
HEIGHT: 69 IN | BODY MASS INDEX: 34.11 KG/M2 | HEART RATE: 81 BPM | TEMPERATURE: 98.3 F | SYSTOLIC BLOOD PRESSURE: 173 MMHG | WEIGHT: 230.31 LBS | DIASTOLIC BLOOD PRESSURE: 94 MMHG | OXYGEN SATURATION: 97 % | RESPIRATION RATE: 12 BRPM

## 2021-05-16 VITALS — WEIGHT: 215 LBS | BODY MASS INDEX: 31.84 KG/M2 | HEIGHT: 69 IN | RESPIRATION RATE: 14 BRPM

## 2021-05-16 VITALS — HEART RATE: 55 BPM | HEIGHT: 69 IN | BODY MASS INDEX: 33.68 KG/M2 | OXYGEN SATURATION: 95 % | WEIGHT: 227.37 LBS

## 2021-06-14 RX ORDER — PANTOPRAZOLE SODIUM 40 MG/1
TABLET, DELAYED RELEASE ORAL
Qty: 30 TABLET | Refills: 2 | Status: SHIPPED | OUTPATIENT
Start: 2021-06-14 | End: 2021-08-12 | Stop reason: SDUPTHER

## 2021-08-09 ENCOUNTER — TELEPHONE (OUTPATIENT)
Dept: FAMILY MEDICINE CLINIC | Facility: CLINIC | Age: 66
End: 2021-08-09

## 2021-08-09 NOTE — TELEPHONE ENCOUNTER
Caller: Hakanpeter Abdiaziz L    Relationship: Self    Best call back number: [phone]    What medication are you requesting: PRESCRIPTION FOR HIGH SUGAR LEVELS    What are your current symptoms: NO KNOWN SYMPTOMS    How long have you been experiencing symptoms: UNKNOWN    Have you had these symptoms before:    [x] Yes  [] No    Have you been treated for these symptoms before:   [x] Yes  [] No    If a prescription is needed, what is your preferred pharmacy and phone number:    [facility], [place], [place] - [address] - [phone] [facility] [phone]   [phone]    Additional notes:  PATIENT SAID SHE WENT IN FOR A WELLCARE VISIT FOR HER INSURANCE THEY DID A SUGAR LEVEL TEST AND HER SUGAR LEVEL , THEY ASKED HER TO CONTACT HER PRIMARY CARE TO SEE IF SHE NEEDED AN APPOINTMENT AND/OR IF SHE CAN GET A MEDICATION TO HELP.     OFFERED SAME DAY VISIT, PATIENT SAID SHE COULD NOT MAKE IT IN TODAY 08.09.2021. ASKED ABOUT SYMPTOMS SHE SAID SHE DIDN'T HAVE ANY.

## 2021-08-11 ENCOUNTER — OFFICE VISIT (OUTPATIENT)
Dept: FAMILY MEDICINE CLINIC | Facility: CLINIC | Age: 66
End: 2021-08-11

## 2021-08-11 VITALS
SYSTOLIC BLOOD PRESSURE: 148 MMHG | WEIGHT: 231.5 LBS | HEART RATE: 82 BPM | OXYGEN SATURATION: 96 % | BODY MASS INDEX: 34.29 KG/M2 | DIASTOLIC BLOOD PRESSURE: 70 MMHG | HEIGHT: 69 IN | TEMPERATURE: 97.5 F | RESPIRATION RATE: 18 BRPM

## 2021-08-11 DIAGNOSIS — I10 ESSENTIAL HYPERTENSION: ICD-10-CM

## 2021-08-11 DIAGNOSIS — E78.2 MIXED HYPERLIPIDEMIA: Primary | ICD-10-CM

## 2021-08-11 DIAGNOSIS — E11.65 TYPE 2 DIABETES MELLITUS WITH HYPERGLYCEMIA, WITHOUT LONG-TERM CURRENT USE OF INSULIN (HCC): ICD-10-CM

## 2021-08-11 PROBLEM — F32.A DEPRESSION: Status: ACTIVE | Noted: 2021-08-11

## 2021-08-11 PROBLEM — E11.9 DIABETES MELLITUS, TYPE II (HCC): Status: ACTIVE | Noted: 2021-08-11

## 2021-08-11 PROBLEM — J45.909 ASTHMA: Status: ACTIVE | Noted: 2021-08-11

## 2021-08-11 PROBLEM — M19.90 ARTHRITIS: Status: ACTIVE | Noted: 2021-08-11

## 2021-08-11 PROBLEM — E78.5 HYPERLIPIDEMIA: Status: ACTIVE | Noted: 2021-08-11

## 2021-08-11 PROBLEM — J30.2 SEASONAL ALLERGIC RHINITIS: Status: ACTIVE | Noted: 2021-08-11

## 2021-08-11 LAB
ALBUMIN SERPL-MCNC: 4.2 G/DL (ref 3.5–5.2)
ALBUMIN UR-MCNC: 1.5 MG/DL
ALBUMIN/GLOB SERPL: 1.5 G/DL
ALP SERPL-CCNC: 127 U/L (ref 39–117)
ALT SERPL W P-5'-P-CCNC: 136 U/L (ref 1–33)
ANION GAP SERPL CALCULATED.3IONS-SCNC: 12.5 MMOL/L (ref 5–15)
AST SERPL-CCNC: 90 U/L (ref 1–32)
BASOPHILS # BLD AUTO: 0.04 10*3/MM3 (ref 0–0.2)
BASOPHILS NFR BLD AUTO: 0.9 % (ref 0–1.5)
BILIRUB SERPL-MCNC: 0.6 MG/DL (ref 0–1.2)
BUN SERPL-MCNC: 10 MG/DL (ref 8–23)
BUN/CREAT SERPL: 14.5 (ref 7–25)
CALCIUM SPEC-SCNC: 9.4 MG/DL (ref 8.6–10.5)
CHLORIDE SERPL-SCNC: 100 MMOL/L (ref 98–107)
CHOLEST SERPL-MCNC: 192 MG/DL (ref 0–200)
CO2 SERPL-SCNC: 23.5 MMOL/L (ref 22–29)
CREAT SERPL-MCNC: 0.69 MG/DL (ref 0.57–1)
DEPRECATED RDW RBC AUTO: 43.4 FL (ref 37–54)
EOSINOPHIL # BLD AUTO: 0.14 10*3/MM3 (ref 0–0.4)
EOSINOPHIL NFR BLD AUTO: 3 % (ref 0.3–6.2)
ERYTHROCYTE [DISTWIDTH] IN BLOOD BY AUTOMATED COUNT: 12.7 % (ref 12.3–15.4)
GFR SERPL CREATININE-BSD FRML MDRD: 85 ML/MIN/1.73
GLOBULIN UR ELPH-MCNC: 2.8 GM/DL
GLUCOSE SERPL-MCNC: 300 MG/DL (ref 65–99)
HBA1C MFR BLD: 12.3 % (ref 4.8–5.6)
HCT VFR BLD AUTO: 46.5 % (ref 34–46.6)
HDLC SERPL-MCNC: 35 MG/DL (ref 40–60)
HGB BLD-MCNC: 14.8 G/DL (ref 12–15.9)
IMM GRANULOCYTES # BLD AUTO: 0.01 10*3/MM3 (ref 0–0.05)
IMM GRANULOCYTES NFR BLD AUTO: 0.2 % (ref 0–0.5)
LDLC SERPL CALC-MCNC: 115 MG/DL (ref 0–100)
LDLC/HDLC SERPL: 3.11 {RATIO}
LYMPHOCYTES # BLD AUTO: 1.35 10*3/MM3 (ref 0.7–3.1)
LYMPHOCYTES NFR BLD AUTO: 28.9 % (ref 19.6–45.3)
MCH RBC QN AUTO: 30.1 PG (ref 26.6–33)
MCHC RBC AUTO-ENTMCNC: 31.8 G/DL (ref 31.5–35.7)
MCV RBC AUTO: 94.5 FL (ref 79–97)
MONOCYTES # BLD AUTO: 0.47 10*3/MM3 (ref 0.1–0.9)
MONOCYTES NFR BLD AUTO: 10.1 % (ref 5–12)
NEUTROPHILS NFR BLD AUTO: 2.66 10*3/MM3 (ref 1.7–7)
NEUTROPHILS NFR BLD AUTO: 56.9 % (ref 42.7–76)
NRBC BLD AUTO-RTO: 0 /100 WBC (ref 0–0.2)
PLATELET # BLD AUTO: 231 10*3/MM3 (ref 140–450)
PMV BLD AUTO: 11.6 FL (ref 6–12)
POTASSIUM SERPL-SCNC: 4.9 MMOL/L (ref 3.5–5.2)
PROT SERPL-MCNC: 7 G/DL (ref 6–8.5)
RBC # BLD AUTO: 4.92 10*6/MM3 (ref 3.77–5.28)
SODIUM SERPL-SCNC: 136 MMOL/L (ref 136–145)
TRIGL SERPL-MCNC: 241 MG/DL (ref 0–150)
TSH SERPL DL<=0.05 MIU/L-ACNC: 3.57 UIU/ML (ref 0.27–4.2)
VLDLC SERPL-MCNC: 42 MG/DL (ref 5–40)
WBC # BLD AUTO: 4.67 10*3/MM3 (ref 3.4–10.8)

## 2021-08-11 PROCEDURE — 80061 LIPID PANEL: CPT | Performed by: NURSE PRACTITIONER

## 2021-08-11 PROCEDURE — 99214 OFFICE O/P EST MOD 30 MIN: CPT | Performed by: NURSE PRACTITIONER

## 2021-08-11 PROCEDURE — 36415 COLL VENOUS BLD VENIPUNCTURE: CPT | Performed by: NURSE PRACTITIONER

## 2021-08-11 PROCEDURE — 80053 COMPREHEN METABOLIC PANEL: CPT | Performed by: NURSE PRACTITIONER

## 2021-08-11 PROCEDURE — 84443 ASSAY THYROID STIM HORMONE: CPT | Performed by: NURSE PRACTITIONER

## 2021-08-11 PROCEDURE — 83036 HEMOGLOBIN GLYCOSYLATED A1C: CPT | Performed by: NURSE PRACTITIONER

## 2021-08-11 PROCEDURE — 82043 UR ALBUMIN QUANTITATIVE: CPT | Performed by: NURSE PRACTITIONER

## 2021-08-11 PROCEDURE — 85025 COMPLETE CBC W/AUTO DIFF WBC: CPT | Performed by: NURSE PRACTITIONER

## 2021-08-11 RX ORDER — HYDROXYZINE 50 MG/1
50 TABLET, FILM COATED ORAL 3 TIMES DAILY PRN
COMMUNITY
End: 2021-12-07

## 2021-08-11 NOTE — PROGRESS NOTES
Chief Complaint  Hypertension, Hyperlipidemia, and Med Refill    Subjective          Abdiaziz Krishna presents to [facility]  History of Present Illness  She was doing keto and lost a lot of wt but then when Covid hit she started eating again whatever she wants.  She has only been taking her anxiety med which has not been helping that much and her GERD med.  She is doing good with the current stomach med.  She is just worried about her labs.  She had to go to the [facility] clinic to get labs for her husbands work.  Her sugar she said was over 300.      Past Medical History:   • Acid reflux   • Acute sinusitis   • Allergic rhinitis   • Arthritis   • Asthma   • Broken bones    2015 LEFT ANKLE AND RIGHT LEG   • Bronchitis   • Depression   • DM (diabetes mellitus) (CMS/[facility])   • Gallstones   • HLD (hyperlipidemia)   • HTN (hypertension)   • Incomplete tear of left rotator cuff   • Otitis media   • Plantar fasciitis   • Seasonal allergies   • T2DM (type 2 diabetes mellitus) (CMS/[facility])   • Tendinopathy of rotator cuff, left   • Thyroid disorder       Allergies  Patient has no known allergies.    Past Surgical History:   • ANKLE SURGERY   • BREAST AUGMENTATION   • CHOLECYSTECTOMY   • COLONOSCOPY   • LEG SURGERY    FRACTURE   • OTHER SURGICAL HISTORY    METALIMPLANTS   • THYROIDECTOMY, PARTIAL    GOITER 85% REMOVED   • TUBAL ABDOMINAL LIGATION       Social History     Tobacco Use   • Smoking status: Former Smoker     Packs/day: 2.00     Years: 46.00     Pack years: 92.00     Types: Cigarettes     Start date:      Quit date: 2016     Years since quittin.6   • Smokeless tobacco: Never Used   • Tobacco comment: SMOKED FOR 35-40 YEARS QUIT 4 YEARS AGO   Substance Use Topics   • Alcohol use: Yes     Comment: WAS HEAVY DRINKER FOR YEARS NOW RARELY   • Drug use: Never       Family History   Problem Relation Age of Onset   • Emphysema Mother    • Heart failure Mother    • Arthritis Mother   "  • Heart disease Father    • Diabetes Father    • Aneurysm Father         ABDOMINAL DECD AGE 88   • Diabetes Daughter         Health Maintenance Due   Topic Date Due   • DXA SCAN  Never done   • ANNUAL PHYSICAL  Never done   • Pneumococcal Vaccine 65+ (1 of 2 - PPSV23) Never done   • TDAP/TD VACCINES (1 - Tdap) Never done   • ZOSTER VACCINE (1 of 2) Never done   • LUNG CANCER SCREENING  Never done   • MAMMOGRAM  03/06/2021   • HEPATITIS C SCREENING  Never done   • DIABETIC FOOT EXAM  Never done   • DIABETIC EYE EXAM  Never done          Current Outpatient Medications:   •  hydrOXYzine (ATARAX) 50 MG tablet, Take 50 mg by mouth 3 (Three) Times a Day As Needed for Itching., Disp: , Rfl:   •  pantoprazole (PROTONIX) 40 MG EC tablet, TAKE 1 TABLET BY MOUTH DAILY, Disp: 30 tablet, Rfl: 2    There are no discontinued medications.    Immunization History   Administered Date(s) Administered   • Hepatitis A 04/01/2018, 10/01/2018   • Influenza, Unspecified 10/20/2020       Review of Systems   Constitutional: Negative for fever.   HENT: Negative for sore throat.         Objective       Vitals:    08/11/21 0910   BP: 148/70   Pulse:    Resp:    Temp:    SpO2:      Body mass index is 34.19 kg/m².     Vitals:    08/11/21 0905 08/11/21 0910   BP: 162/85 148/70   Pulse: 82    Resp: 18    Temp: 97.5 °F (36.4 °C)    SpO2: 96%    Weight: 105 kg (231 lb 8 oz)    Height: 175.3 cm (69\")        Physical Exam  Vitals reviewed.   Constitutional:       Appearance: Normal appearance. She is well-developed.   Cardiovascular:      Rate and Rhythm: Normal rate and regular rhythm.      Heart sounds: Normal heart sounds. No murmur heard.     Pulmonary:      Effort: Pulmonary effort is normal.      Breath sounds: Normal breath sounds.   Neurological:      Mental Status: She is alert and oriented to person, place, and time.      Cranial Nerves: No cranial nerve deficit.      Motor: No weakness.   Psychiatric:         Mood and Affect: Mood and " affect normal.             Result Review :     The following data was reviewed by: GEMINI James on 08/11/2021:    Common labs    Common Labsle 9/1/20 9/1/20 8/11/21 8/11/21 8/11/21 8/11/21 8/11/21    1817 1817 0933 0933 0933 0933 0934   Glucose    300 (A)      Glucose  113 (A)        BUN  21  10      Creatinine  0.95 (A)  0.69      eGFR Non African Am    85      Sodium  140  136      Potassium  4.7  4.9      Chloride  103  100      Calcium  10.2  9.4      Albumin  4.6  4.20      Total Bilirubin  0.25  0.6      Alkaline Phosphatase  115  127 (A)      AST (SGOT)  23  90 (A)      ALT (SGPT)  29  136 (A)      WBC  5.85 4.67       Hemoglobin  15.3 14.8       Hematocrit  49.2 (A) 46.5       Platelets  298 231       Total Cholesterol     192     Total Cholesterol  186        Triglycerides  131   241 (A)     HDL Cholesterol  46   35 (A)     LDL Cholesterol   114 (A)   115 (A)     Hemoglobin A1C 6.6 (A)     12.30 (A)    Microalbumin, Urine       1.5   (A) Abnormal value       Comments are available for some flowsheets but are not being displayed.                          Assessment and Plan      Diagnoses and all orders for this visit:    1. Mixed hyperlipidemia (Primary)  -     CBC & Differential  -     Comprehensive Metabolic Panel  -     TSH  -     Lipid Panel    2. Essential hypertension  -     CBC & Differential  -     Comprehensive Metabolic Panel  -     TSH  -     Lipid Panel    3. Type 2 diabetes mellitus with hyperglycemia, without long-term current use of insulin (CMS/[facility])  -     Hemoglobin A1c  -     MicroAlbumin, Urine, Random -    Other orders  -     SCANNED - COLONOSCOPY            Follow Up     No follow-ups on file.  We will check labs to see what the sugar is--if over 7.5 we need to treat with med.  She been eating fresh veggies.  Patient was given instructions and counseling regarding her condition or for health maintenance advice. Please see specific information pulled into the AVS if  appropriate.   Anahi Flores, APRN

## 2021-08-12 ENCOUNTER — TELEPHONE (OUTPATIENT)
Dept: FAMILY MEDICINE CLINIC | Facility: CLINIC | Age: 66
End: 2021-08-12

## 2021-08-12 RX ORDER — SITAGLIPTIN AND METFORMIN HYDROCHLORIDE 1000; 50 MG/1; MG/1
1 TABLET, FILM COATED ORAL 2 TIMES DAILY WITH MEALS
Qty: 60 TABLET | Refills: 2 | Status: SHIPPED | OUTPATIENT
Start: 2021-08-12 | End: 2021-11-04

## 2021-08-12 RX ORDER — ATORVASTATIN CALCIUM 20 MG/1
20 TABLET, FILM COATED ORAL DAILY
Qty: 30 TABLET | Refills: 2 | Status: SHIPPED | OUTPATIENT
Start: 2021-08-12 | End: 2021-11-04

## 2021-08-12 RX ORDER — PANTOPRAZOLE SODIUM 40 MG/1
40 TABLET, DELAYED RELEASE ORAL DAILY
Qty: 30 TABLET | Refills: 0 | Status: SHIPPED | OUTPATIENT
Start: 2021-08-12 | End: 2021-11-09 | Stop reason: SDUPTHER

## 2021-10-27 ENCOUNTER — TELEPHONE (OUTPATIENT)
Dept: FAMILY MEDICINE CLINIC | Facility: CLINIC | Age: 66
End: 2021-10-27

## 2021-10-27 DIAGNOSIS — E78.2 MIXED HYPERLIPIDEMIA: ICD-10-CM

## 2021-10-27 DIAGNOSIS — E11.65 TYPE 2 DIABETES MELLITUS WITH HYPERGLYCEMIA, WITHOUT LONG-TERM CURRENT USE OF INSULIN (HCC): Primary | ICD-10-CM

## 2021-10-27 NOTE — TELEPHONE ENCOUNTER
Caller: Abdiaziz Krishna    Relationship: Self    Best call back number:[phone]    What orders are you requesting (i.e. lab or imaging): FULL PANEL LAB ORDERS    In what timeframe would the patient need to come in: ASA    Where will you receive your lab/imaging services: [facility]    Additional notes: PATIENT WOULD LIKE TO DO HER LABS ON 11/05/21 AND HAS HER APPOINTMENT WITH KATHR[facility] ON 11/09/21. SHE IS ASKING THAT [facility]HR[facility] JUST DO A FULL PANEL FOR HER. PLEASE LET PATIENT KNOW ONCE ORDERS ARE PLACED

## 2021-11-01 ENCOUNTER — LAB (OUTPATIENT)
Dept: LAB | Facility: HOSPITAL | Age: 66
End: 2021-11-01

## 2021-11-01 DIAGNOSIS — E11.65 TYPE 2 DIABETES MELLITUS WITH HYPERGLYCEMIA, WITHOUT LONG-TERM CURRENT USE OF INSULIN (HCC): ICD-10-CM

## 2021-11-01 DIAGNOSIS — E78.2 MIXED HYPERLIPIDEMIA: ICD-10-CM

## 2021-11-01 LAB
ALBUMIN SERPL-MCNC: 4.3 G/DL (ref 3.5–5.2)
ALBUMIN UR-MCNC: 1.4 MG/DL
ALBUMIN/GLOB SERPL: 1.7 G/DL
ALP SERPL-CCNC: 95 U/L (ref 39–117)
ALT SERPL W P-5'-P-CCNC: 42 U/L (ref 1–33)
ANION GAP SERPL CALCULATED.3IONS-SCNC: 8.9 MMOL/L (ref 5–15)
AST SERPL-CCNC: 25 U/L (ref 1–32)
BASOPHILS # BLD AUTO: 0.06 10*3/MM3 (ref 0–0.2)
BASOPHILS NFR BLD AUTO: 0.8 % (ref 0–1.5)
BILIRUB SERPL-MCNC: 0.4 MG/DL (ref 0–1.2)
BUN SERPL-MCNC: 16 MG/DL (ref 8–23)
BUN/CREAT SERPL: 17.6 (ref 7–25)
CALCIUM SPEC-SCNC: 9.3 MG/DL (ref 8.6–10.5)
CHLORIDE SERPL-SCNC: 103 MMOL/L (ref 98–107)
CHOLEST SERPL-MCNC: 91 MG/DL (ref 0–200)
CO2 SERPL-SCNC: 26.1 MMOL/L (ref 22–29)
CREAT SERPL-MCNC: 0.91 MG/DL (ref 0.57–1)
DEPRECATED RDW RBC AUTO: 40.5 FL (ref 37–54)
EOSINOPHIL # BLD AUTO: 0.18 10*3/MM3 (ref 0–0.4)
EOSINOPHIL NFR BLD AUTO: 2.5 % (ref 0.3–6.2)
ERYTHROCYTE [DISTWIDTH] IN BLOOD BY AUTOMATED COUNT: 12 % (ref 12.3–15.4)
GFR SERPL CREATININE-BSD FRML MDRD: 62 ML/MIN/1.73
GLOBULIN UR ELPH-MCNC: 2.5 GM/DL
GLUCOSE SERPL-MCNC: 132 MG/DL (ref 65–99)
HBA1C MFR BLD: 8.46 % (ref 4.8–5.6)
HCT VFR BLD AUTO: 41.3 % (ref 34–46.6)
HDLC SERPL-MCNC: 37 MG/DL (ref 40–60)
HGB BLD-MCNC: 13.7 G/DL (ref 12–15.9)
IMM GRANULOCYTES # BLD AUTO: 0.02 10*3/MM3 (ref 0–0.05)
IMM GRANULOCYTES NFR BLD AUTO: 0.3 % (ref 0–0.5)
LDLC SERPL CALC-MCNC: 32 MG/DL (ref 0–100)
LDLC/HDLC SERPL: 0.79 {RATIO}
LYMPHOCYTES # BLD AUTO: 2.31 10*3/MM3 (ref 0.7–3.1)
LYMPHOCYTES NFR BLD AUTO: 32.3 % (ref 19.6–45.3)
MCH RBC QN AUTO: 30.5 PG (ref 26.6–33)
MCHC RBC AUTO-ENTMCNC: 33.2 G/DL (ref 31.5–35.7)
MCV RBC AUTO: 92 FL (ref 79–97)
MONOCYTES # BLD AUTO: 0.52 10*3/MM3 (ref 0.1–0.9)
MONOCYTES NFR BLD AUTO: 7.3 % (ref 5–12)
NEUTROPHILS NFR BLD AUTO: 4.07 10*3/MM3 (ref 1.7–7)
NEUTROPHILS NFR BLD AUTO: 56.8 % (ref 42.7–76)
NRBC BLD AUTO-RTO: 0 /100 WBC (ref 0–0.2)
PLATELET # BLD AUTO: 284 10*3/MM3 (ref 140–450)
PMV BLD AUTO: 10.9 FL (ref 6–12)
POTASSIUM SERPL-SCNC: 4 MMOL/L (ref 3.5–5.2)
PROT SERPL-MCNC: 6.8 G/DL (ref 6–8.5)
RBC # BLD AUTO: 4.49 10*6/MM3 (ref 3.77–5.28)
SODIUM SERPL-SCNC: 138 MMOL/L (ref 136–145)
TRIGL SERPL-MCNC: 123 MG/DL (ref 0–150)
TSH SERPL DL<=0.05 MIU/L-ACNC: 2.15 UIU/ML (ref 0.27–4.2)
VLDLC SERPL-MCNC: 22 MG/DL (ref 5–40)
WBC # BLD AUTO: 7.16 10*3/MM3 (ref 3.4–10.8)

## 2021-11-01 PROCEDURE — 83036 HEMOGLOBIN GLYCOSYLATED A1C: CPT

## 2021-11-01 PROCEDURE — 80061 LIPID PANEL: CPT

## 2021-11-01 PROCEDURE — 84443 ASSAY THYROID STIM HORMONE: CPT

## 2021-11-01 PROCEDURE — 82043 UR ALBUMIN QUANTITATIVE: CPT

## 2021-11-01 PROCEDURE — 85025 COMPLETE CBC W/AUTO DIFF WBC: CPT

## 2021-11-01 PROCEDURE — 80053 COMPREHEN METABOLIC PANEL: CPT

## 2021-11-01 PROCEDURE — 36415 COLL VENOUS BLD VENIPUNCTURE: CPT

## 2021-11-03 DIAGNOSIS — E78.2 MIXED HYPERLIPIDEMIA: ICD-10-CM

## 2021-11-03 DIAGNOSIS — E11.65 TYPE 2 DIABETES MELLITUS WITH HYPERGLYCEMIA, WITHOUT LONG-TERM CURRENT USE OF INSULIN (HCC): ICD-10-CM

## 2021-11-04 RX ORDER — SITAGLIPTIN AND METFORMIN HYDROCHLORIDE 1000; 50 MG/1; MG/1
TABLET, FILM COATED ORAL
Qty: 60 TABLET | Refills: 2 | Status: SHIPPED | OUTPATIENT
Start: 2021-11-04 | End: 2022-05-31 | Stop reason: SDUPTHER

## 2021-11-04 RX ORDER — ATORVASTATIN CALCIUM 20 MG/1
TABLET, FILM COATED ORAL
Qty: 30 TABLET | Refills: 2 | Status: SHIPPED | OUTPATIENT
Start: 2021-11-04 | End: 2021-12-07

## 2021-11-09 ENCOUNTER — OFFICE VISIT (OUTPATIENT)
Dept: FAMILY MEDICINE CLINIC | Facility: CLINIC | Age: 66
End: 2021-11-09

## 2021-11-09 VITALS
HEART RATE: 82 BPM | DIASTOLIC BLOOD PRESSURE: 80 MMHG | OXYGEN SATURATION: 96 % | WEIGHT: 218.9 LBS | RESPIRATION RATE: 16 BRPM | HEIGHT: 69 IN | TEMPERATURE: 98 F | SYSTOLIC BLOOD PRESSURE: 142 MMHG | BODY MASS INDEX: 32.42 KG/M2

## 2021-11-09 DIAGNOSIS — F41.9 ANXIETY: ICD-10-CM

## 2021-11-09 DIAGNOSIS — I10 PRIMARY HYPERTENSION: ICD-10-CM

## 2021-11-09 DIAGNOSIS — E11.65 TYPE 2 DIABETES MELLITUS WITH HYPERGLYCEMIA, WITHOUT LONG-TERM CURRENT USE OF INSULIN (HCC): Primary | ICD-10-CM

## 2021-11-09 DIAGNOSIS — K21.9 GASTROESOPHAGEAL REFLUX DISEASE WITHOUT ESOPHAGITIS: ICD-10-CM

## 2021-11-09 DIAGNOSIS — F41.0 PANIC DISORDER: ICD-10-CM

## 2021-11-09 PROBLEM — M54.50 LOW BACK PAIN: Status: ACTIVE | Noted: 2021-11-09

## 2021-11-09 PROBLEM — M54.10 RADICULOPATHY: Status: ACTIVE | Noted: 2021-11-09

## 2021-11-09 PROCEDURE — 99213 OFFICE O/P EST LOW 20 MIN: CPT | Performed by: NURSE PRACTITIONER

## 2021-11-09 RX ORDER — PANTOPRAZOLE SODIUM 40 MG/1
40 TABLET, DELAYED RELEASE ORAL DAILY
Qty: 30 TABLET | Refills: 2 | Status: SHIPPED | OUTPATIENT
Start: 2021-11-09 | End: 2021-11-11 | Stop reason: SDUPTHER

## 2021-11-09 NOTE — PROGRESS NOTES
Answers for HPI/ROS submitted by the patient on 11/7/2021  What is the primary reason for your visit?: Diabetes  Diabetes type: type 2  MedicAlert ID: No  Disease duration: 3 years  Symptom course: improving  CAD risks: dyslipidemia, hypertension, obesity, stress  Current treatments: oral agent (monotherapy)  Treatment compliance: most of the time  Blood glucose trend: decreasing steadily  Weight trend: decreasing steadily  Current diet: generally healthy  Meal planning: avoidance of concentrated sweets, carbohydrate counting  Exercise: intermittently  Dietitian visit: No  Eye exam current: Yes  Sees podiatrist: No    Chief Complaint  Hypertension (3 momth follow up ), Hyperlipidemia, Anxiety, Depression, and Hypothyroidism    Subjective          Abdiaziz Krishna presents to [facility]  History of Present Illness  She is here for follow-up for her diabetes.  She has brought her A1c down to the 8-ashtyn pernell.  She is back to watching the ketones in the carbs cutting out flour and pastas.  She feels good.  She has also had her cataracts removed which is doing well.  She does need a refill of her Protonix, Janumet, Lipitor though I filled the Janumet and the Lipitor on November 4.        Past Medical History:   • Acid reflux   • Acute sinusitis   • Allergic rhinitis   • Arthritis   • Asthma   • Broken bones    07/29/2015 LEFT ANKLE AND RIGHT LEG   • Bronchitis   • Cataract    both removed October 2021   • Depression   • Diverticulosis   • DM (diabetes mellitus) (formerly [facility])   • Gallstones   • HLD (hyperlipidemia)   • HTN (hypertension)   • Incomplete tear of left rotator cuff   • Otitis media   • Plantar fasciitis   • Seasonal allergies   • T2DM (type 2 diabetes mellitus) (formerly [facility])   • Tendinopathy of rotator cuff, left   • Thyroid disorder       Allergies  Patient has no known allergies.    Past Surgical History:   • ANKLE SURGERY   • BREAST AUGMENTATION   • CHOLECYSTECTOMY   • COLONOSCOPY   •  FRACTURE SURGERY   • HYSTERECTOMY   • LEG SURGERY    FRACTURE   • OTHER SURGICAL HISTORY    METALIMPLANTS   • THYROIDECTOMY, PARTIAL    GOITER 85% REMOVED   • TUBAL ABDOMINAL LIGATION       Social History     Tobacco Use   • Smoking status: Former Smoker     Packs/day: 2.00     Years: 46.00     Pack years: 92.00     Types: Cigarettes     Start date:      Quit date: 2016     Years since quittin.8   • Smokeless tobacco: Never Used   • Tobacco comment: SMOKED FOR 35-40 YEARS QUIT 4 YEARS AGO   Substance Use Topics   • Alcohol use: Yes     Alcohol/week: 0.0 standard drinks     Comment: WAS HEAVY DRINKER FOR YEARS NOW RARELY   • Drug use: Never       Family History   Problem Relation Age of Onset   • Emphysema Mother    • Heart failure Mother    • Arthritis Mother    • Heart disease Father    • Diabetes Father    • Aneurysm Father         ABDOMINAL DECD AGE 88   • Diabetes Daughter         gestational        Health Maintenance Due   Topic Date Due   • DXA SCAN  Never done   • ANNUAL PHYSICAL  Never done   • Pneumococcal Vaccine 65+ (1 of 2 - PPSV23) Never done   • TDAP/TD VACCINES (1 - Tdap) Never done   • ZOSTER VACCINE (1 of 2) Never done   • LUNG CANCER SCREENING  Never done   • MAMMOGRAM  2021   • HEPATITIS C SCREENING  Never done   • DIABETIC FOOT EXAM  Never done   • DIABETIC EYE EXAM  Never done   • COVID-19 Vaccine (3 - Pfizer booster) 2021          Current Outpatient Medications:   •  atorvastatin (LIPITOR) 20 MG tablet, TAKE 1 TABLET BY MOUTH EVERY DAY, Disp: 30 tablet, Rfl: 2  •  hydrOXYzine (ATARAX) 50 MG tablet, Take 50 mg by mouth 3 (Three) Times a Day As Needed for Itching., Disp: , Rfl:   •  Janumet  MG per tablet, TAKE ONE TABLET BY MOUTH TWICE DAILY WITH MEALS, Disp: 60 tablet, Rfl: 2  •  pantoprazole (PROTONIX) 40 MG EC tablet, Take 1 tablet by mouth Daily., Disp: 30 tablet, Rfl: 2    Medications Discontinued During This Encounter   Medication Reason   • pantoprazole  (PROTONIX) 40 MG EC tablet Reorder       Immunization History   Administered Date(s) Administered   • COVID-19 (PFIZER) 04/23/2021, 05/14/2021   • Hepatitis A 04/01/2018, 10/01/2018   • Influenza, Unspecified 10/20/2020       Review of Systems   Constitutional: Negative for fatigue.   Respiratory: Negative for cough and shortness of breath.    Cardiovascular: Negative for chest pain.   Gastrointestinal: Negative for diarrhea, nausea and vomiting.        Objective       Vitals:    11/09/21 1105   BP: 142/80   Pulse: 82   Resp: 16   Temp: 98 °F (36.7 °C)   SpO2: 96%     Body mass index is 32.33 kg/m².         Physical Exam  Vitals reviewed.   Constitutional:       Appearance: Normal appearance. She is well-developed.   Cardiovascular:      Rate and Rhythm: Normal rate and regular rhythm.      Heart sounds: Normal heart sounds. No murmur heard.      Pulmonary:      Effort: Pulmonary effort is normal.      Breath sounds: Normal breath sounds.   Musculoskeletal:      Right lower leg: No edema.      Left lower leg: No edema.   Neurological:      Mental Status: She is alert and oriented to person, place, and time.      Cranial Nerves: No cranial nerve deficit.      Motor: No weakness.   Psychiatric:         Mood and Affect: Mood and affect normal.             Result Review :     The following data was reviewed by: GEMINI James on 11/09/2021:                     Assessment and Plan      Diagnoses and all orders for this visit:    1. Type 2 diabetes mellitus with hyperglycemia, without long-term current use of insulin (HCC) (Primary)  -     CBC & Differential; Future  -     Comprehensive Metabolic Panel; Future  -     TSH; Future  -     Lipid Panel; Future  -     Hemoglobin A1c; Future    2. Primary hypertension  -     CBC & Differential; Future  -     Comprehensive Metabolic Panel; Future  -     TSH; Future  -     Lipid Panel; Future  -     Hemoglobin A1c; Future    3. Gastroesophageal reflux disease without  esophagitis  -     pantoprazole (PROTONIX) 40 MG EC tablet; Take 1 tablet by mouth Daily.  Dispense: 30 tablet; Refill: 2    4. Anxiety    5. Panic disorder            Follow Up     Return in about 3 months (around 2/9/2022) for and 3-4 weeks for anxiety.  Follow-up in 3 months for labs and appt. Call with any concerns or questions that you may have regarding your medications or history.    I have reviewed all medications and at this time no medications changes need to be adjusted for all chronic conditions.  She will make an appt soon for the xanax for the driving to look for the house.  Patient was given instructions and counseling regarding her condition or for health maintenance advice. Please see specific information pulled into the AVS if appropriate.   GEMINI James

## 2021-11-11 ENCOUNTER — TELEMEDICINE (OUTPATIENT)
Dept: FAMILY MEDICINE CLINIC | Facility: TELEHEALTH | Age: 66
End: 2021-11-11

## 2021-11-11 DIAGNOSIS — R39.89 SUSPECTED UTI: Primary | ICD-10-CM

## 2021-11-11 PROCEDURE — 99213 OFFICE O/P EST LOW 20 MIN: CPT | Performed by: NURSE PRACTITIONER

## 2021-11-11 RX ORDER — ACETAMINOPHEN 160 MG/5ML
SOLUTION ORAL EVERY 24 HOURS
COMMUNITY
End: 2021-12-07

## 2021-11-11 RX ORDER — PANTOPRAZOLE SODIUM 40 MG/1
TABLET, DELAYED RELEASE ORAL
COMMUNITY
End: 2022-05-31 | Stop reason: SDUPTHER

## 2021-11-11 RX ORDER — ETODOLAC 400 MG/1
TABLET, FILM COATED ORAL EVERY 12 HOURS SCHEDULED
COMMUNITY
End: 2021-12-07

## 2021-11-11 RX ORDER — CHLORAL HYDRATE 500 MG
CAPSULE ORAL
COMMUNITY
End: 2021-12-07

## 2021-11-11 RX ORDER — GABAPENTIN 300 MG/1
CAPSULE ORAL
COMMUNITY
End: 2021-12-07

## 2021-11-11 RX ORDER — NITROFURANTOIN 25; 75 MG/1; MG/1
100 CAPSULE ORAL 2 TIMES DAILY
Qty: 14 CAPSULE | Refills: 0 | Status: SHIPPED | OUTPATIENT
Start: 2021-11-11 | End: 2021-11-18

## 2021-11-11 RX ORDER — PHENAZOPYRIDINE HYDROCHLORIDE 200 MG/1
200 TABLET, FILM COATED ORAL 3 TIMES DAILY PRN
Qty: 6 TABLET | Refills: 0 | Status: SHIPPED | OUTPATIENT
Start: 2021-11-11 | End: 2021-11-13

## 2021-11-11 RX ORDER — FLUOXETINE HYDROCHLORIDE 40 MG/1
CAPSULE ORAL
COMMUNITY
End: 2021-12-07

## 2021-11-11 NOTE — PATIENT INSTRUCTIONS
Urinary Tract Infection, Adult    A urinary tract infection (UTI) is an infection of any part of the urinary tract. The urinary tract includes the kidneys, ureters, bladder, and urethra. These organs make, store, and get rid of urine in the body.  Your health care provider may use other names to describe the infection. An upper UTI affects the ureters and kidneys (pyelonephritis). A lower UTI affects the bladder (cystitis) and urethra (urethritis).  What are the causes?  Most urinary tract infections are caused by bacteria in your genital area, around the entrance to your urinary tract (urethra). These bacteria grow and cause inflammation of your urinary tract.  What increases the risk?  You are more likely to develop this condition if:  · You have a urinary catheter that stays in place (indwelling).  · You are not able to control when you urinate or have a bowel movement (you have incontinence).  · You are female and you:  ? Use a spermicide or diaphragm for birth control.  ? Have low estrogen levels.  ? Are pregnant.  · You have certain genes that increase your risk (genetics).  · You are sexually active.  · You take antibiotic medicines.  · You have a condition that causes your flow of urine to slow down, such as:  ? An enlarged prostate, if you are male.  ? Blockage in your urethra (stricture).  ? A kidney stone.  ? A nerve condition that affects your bladder control (neurogenic bladder).  ? Not getting enough to drink, or not urinating often.  · You have certain medical conditions, such as:  ? Diabetes.  ? A weak disease-fighting system (immunesystem).  ? Sickle cell disease.  ? Gout.  ? Spinal cord injury.  What are the signs or symptoms?  Symptoms of this condition include:  · Needing to urinate right away (urgently).  · Frequent urination or passing small amounts of urine frequently.  · Pain or burning with urination.  · Blood in the urine.  · Urine that smells bad or unusual.  · Trouble urinating.  · Cloudy  urine.  · Vaginal discharge, if you are female.  · Pain in the abdomen or the lower back.  You may also have:  · Vomiting or a decreased appetite.  · Confusion.  · Irritability or tiredness.  · A fever.  · Diarrhea.  The first symptom in older adults may be confusion. In some cases, they may not have any symptoms until the infection has worsened.  How is this diagnosed?  This condition is diagnosed based on your medical history and a physical exam. You may also have other tests, including:  · Urine tests.  · Blood tests.  · Tests for sexually transmitted infections (STIs).  If you have had more than one UTI, a cystoscopy or imaging studies may be done to determine the cause of the infections.  How is this treated?  Treatment for this condition includes:  · Antibiotic medicine.  · Over-the-counter medicines to treat discomfort.  · Drinking enough water to stay hydrated.  If you have frequent infections or have other conditions such as a kidney stone, you may need to see a health care provider who specializes in the urinary tract (urologist).  In rare cases, urinary tract infections can cause sepsis. Sepsis is a life-threatening condition that occurs when the body responds to an infection. Sepsis is treated in the hospital with IV antibiotics, fluids, and other medicines.  Follow these instructions at home:    Medicines  · Take over-the-counter and prescription medicines only as told by your health care provider.  · If you were prescribed an antibiotic medicine, take it as told by your health care provider. Do not stop using the antibiotic even if you start to feel better.  General instructions  · Make sure you:  ? Empty your bladder often and completely. Do not hold urine for long periods of time.  ? Empty your bladder after sex.  ? Wipe from front to back after a bowel movement if you are female. Use each tissue one time when you wipe.  · Drink enough fluid to keep your urine pale yellow.  · Keep all follow-up  visits as told by your health care provider. This is important.  Contact a health care provider if:  · Your symptoms do not get better after 1-2 days.  · Your symptoms go away and then return.  Get help right away if you have:  · Severe pain in your back or your lower abdomen.  · A fever.  · Nausea or vomiting.  Summary  · A urinary tract infection (UTI) is an infection of any part of the urinary tract, which includes the kidneys, ureters, bladder, and urethra.  · Most urinary tract infections are caused by bacteria in your genital area, around the entrance to your urinary tract (urethra).  · Treatment for this condition often includes antibiotic medicines.  · If you were prescribed an antibiotic medicine, take it as told by your health care provider. Do not stop using the antibiotic even if you start to feel better.  · Keep all follow-up visits as told by your health care provider. This is important.  This information is not intended to replace advice given to you by your health care provider. Make sure you discuss any questions you have with your health care provider.  Document Revised: 12/05/2019 Document Reviewed: 06/27/2019  [employer] Patient Education © 2021 [employer]

## 2021-11-11 NOTE — PROGRESS NOTES
You have chosen to receive care through a telehealth visit.  Do you consent to use a video/audio connection for your medical care today? Yes     CHIEF COMPLAINT  Chief Complaint   Patient presents with   • Urinary Tract Infection         HPI  Abdiaziz Krishna is a 66 y.o. female  presents with complaint of dysuria described, frequency, urgency, small amounts of urine, mild back pain, suprapubic pressure.  She denies nausea/vomiting, fever/chills, hematuria, or vaginal symptoms.  She has had similar symptoms in the past which resolved with antibiotic treatment for UTI.  She was also started on Janumet for type 2 diabetes approximately a month ago.    Review of Systems   Constitutional: Negative for fever.   Genitourinary: Positive for decreased urine volume, dysuria, frequency, pelvic pain and urgency. Negative for flank pain and hematuria.   Musculoskeletal: Positive for back pain.   All other systems reviewed and are negative.      Past Medical History:   Diagnosis Date   • Acid reflux    • Acute sinusitis    • Allergic rhinitis    • Arthritis    • Asthma    • Broken bones 2013,1999 07/29/2015 LEFT ANKLE AND RIGHT LEG   • Bronchitis    • Cataract 2015    both removed October 2021   • Depression    • Diverticulosis 2018   • DM (diabetes mellitus) ([facility])    • Gallstones 07/29/2015,1998   • HLD (hyperlipidemia)    • HTN (hypertension)    • Incomplete tear of left rotator cuff 04/13/2018   • Otitis media    • Plantar fasciitis    • Seasonal allergies    • T2DM (type 2 diabetes mellitus) ([facility])    • Tendinopathy of rotator cuff, left 04/13/2018   • Thyroid disorder 07/29/2015, 1978       Family History   Problem Relation Age of Onset   • Emphysema Mother    • Heart failure Mother    • Arthritis Mother    • Heart disease Father    • Diabetes Father    • Aneurysm Father         ABDOMINAL DECD AGE 88   • Diabetes Daughter         gestational       Social History     Socioeconomic History   • Marital status:    •  Number of children: 2   Tobacco Use   • Smoking status: Former Smoker     Packs/day: 2.00     Years: 46.00     Pack years: 92.00     Types: Cigarettes     Start date:      Quit date: 2016     Years since quittin.8   • Smokeless tobacco: Never Used   • Tobacco comment: SMOKED FOR 35-40 YEARS QUIT 4 YEARS AGO   Substance and Sexual Activity   • Alcohol use: Yes     Alcohol/week: 0.0 standard drinks     Comment: WAS HEAVY DRINKER FOR YEARS NOW RARELY   • Drug use: Never   • Sexual activity: Not Currently         There were no vitals taken for this visit.    PHYSICAL EXAM  Physical Exam   Constitutional: She is oriented to person, place, and time. She appears well-developed and well-nourished. She does not have a sickly appearance. She does not appear ill.   HENT:   Head: Normocephalic and atraumatic.   Pulmonary/Chest: Effort normal.  No respiratory distress.  Neurological: She is alert and oriented to person, place, and time.         Diagnoses and all orders for this visit:    1. Suspected UTI (Primary)  -     nitrofurantoin, macrocrystal-monohydrate, (MACROBID) 100 MG capsule; Take 1 capsule by mouth 2 (Two) Times a Day for 7 days.  Dispense: 14 capsule; Refill: 0  -     phenazopyridine (PYRIDIUM) 200 MG tablet; Take 1 tablet by mouth 3 (Three) Times a Day As Needed for Bladder Spasms for up to 2 days.  Dispense: 6 tablet; Refill: 0    -Macrobid as prescribed - complete entire course of medication even if you begin to feel better.   --Phenazopyridine is for painful urination and bladder spasms--this medication with cause urine to become bright orange and can stain undergarments.    -Continue to increase your fluid intake.   -Abstain from intercourse during antibiotic treatment.   -Practice good perineal hygiene: wipe front to back  -Do not hold your urine- go to the bathroom every 2-3 hours.     -Warning signs: severe abdominal/pelvic/back pain, fever >101, blood in urine - seek medical attention as soon as  possible for a hands on/objective exam and possible labs.     -Follow up with your PCP in 2 days if no improvement in symptoms or if symptoms begin to worsen.         FOLLOW-UP  As discussed during visit with PCP/[facility] if no improvement or Urgent Care/Emergency Department if worsening of symptoms    Patient verbalizes understanding of medication dosage, comfort measures, instructions for treatment and follow-up.    Breanna Camp, APRN  11/11/2021  09:40 EST    This visit was performed via Telehealth.  This patient has been instructed to follow-up with their primary care provider if their symptoms worsen or the treatment provided does not resolve their illness.

## 2021-12-07 ENCOUNTER — OFFICE VISIT (OUTPATIENT)
Dept: FAMILY MEDICINE CLINIC | Facility: CLINIC | Age: 66
End: 2021-12-07

## 2021-12-07 VITALS
HEART RATE: 74 BPM | DIASTOLIC BLOOD PRESSURE: 66 MMHG | SYSTOLIC BLOOD PRESSURE: 134 MMHG | HEIGHT: 69 IN | BODY MASS INDEX: 32.42 KG/M2 | TEMPERATURE: 99 F | WEIGHT: 218.9 LBS | RESPIRATION RATE: 18 BRPM | OXYGEN SATURATION: 99 %

## 2021-12-07 DIAGNOSIS — F41.1 GAD (GENERALIZED ANXIETY DISORDER): Primary | ICD-10-CM

## 2021-12-07 DIAGNOSIS — Z51.81 MEDICATION MONITORING ENCOUNTER: ICD-10-CM

## 2021-12-07 PROCEDURE — 80305 DRUG TEST PRSMV DIR OPT OBS: CPT | Performed by: NURSE PRACTITIONER

## 2021-12-07 PROCEDURE — 99213 OFFICE O/P EST LOW 20 MIN: CPT | Performed by: NURSE PRACTITIONER

## 2021-12-07 RX ORDER — ATORVASTATIN CALCIUM 20 MG/1
20 TABLET, FILM COATED ORAL DAILY
COMMUNITY
End: 2022-05-31 | Stop reason: SDUPTHER

## 2021-12-07 RX ORDER — CLONAZEPAM 1 MG/1
0.5 TABLET ORAL 2 TIMES DAILY PRN
Qty: 15 TABLET | Refills: 0 | Status: SHIPPED | OUTPATIENT
Start: 2021-12-07 | End: 2022-05-31 | Stop reason: SDUPTHER

## 2021-12-07 NOTE — PROGRESS NOTES
Chief Complaint  Anxiety (Follow up )    Subjective          Abdiaziz Krishna presents to [facility]  History of Present Illness  She is heading to [place] Dec 23rd and comes back the .  She is worried and having anxiety with going by airplane and drive around once she gets there.  [place]  She is worried about the covid but she will be wearing her mask and she is immunized.        Past Medical History:   • Acid reflux   • Acute sinusitis   • Allergic rhinitis   • Arthritis   • Asthma   • Broken bones    2015 LEFT ANKLE AND RIGHT LEG   • Bronchitis   • Cataract    both removed 2021   • Depression   • Diverticulosis   • DM (diabetes mellitus) ([facility])   • Gallstones   • HLD (hyperlipidemia)   • HTN (hypertension)   • Incomplete tear of left rotator cuff   • Otitis media   • Plantar fasciitis   • Seasonal allergies   • T2DM (type 2 diabetes mellitus) ([facility])   • Tendinopathy of rotator cuff, left   • Thyroid disorder       Allergies  Patient has no known allergies.    Past Surgical History:   • ANKLE SURGERY   • BREAST AUGMENTATION   • CHOLECYSTECTOMY   • COLONOSCOPY   • FRACTURE SURGERY   • HYSTERECTOMY   • LEG SURGERY    FRACTURE   • OTHER SURGICAL HISTORY    METALIMPLANTS   • THYROIDECTOMY, PARTIAL    GOITER 85% REMOVED   • TUBAL ABDOMINAL LIGATION       Social History     Tobacco Use   • Smoking status: Former Smoker     Packs/day: 2.00     Years: 46.00     Pack years: 92.00     Types: Cigarettes     Start date:      Quit date:      Years since quittin.9   • Smokeless tobacco: Never Used   • Tobacco comment: SMOKED FOR 35-40 YEARS QUIT 4 YEARS AGO   Substance Use Topics   • Alcohol use: Yes     Alcohol/week: 0.0 standard drinks     Comment: WAS HEAVY DRINKER FOR YEARS NOW RARELY   • Drug use: Never       Family History   Problem Relation Age of Onset   • Emphysema Mother    • Heart failure Mother    • Arthritis Mother    • Heart disease  Father    • Diabetes Father    • Aneurysm Father         ABDOMINAL DECD AGE 88   • Diabetes Daughter         gestational        Health Maintenance Due   Topic Date Due   • DXA SCAN  Never done   • ANNUAL PHYSICAL  Never done   • Pneumococcal Vaccine 65+ (1 of 2 - PPSV23) Never done   • TDAP/TD VACCINES (1 - Tdap) Never done   • ZOSTER VACCINE (1 of 2) Never done   • LUNG CANCER SCREENING  Never done   • HEPATITIS C SCREENING  Never done   • DIABETIC FOOT EXAM  Never done   • DIABETIC EYE EXAM  Never done   • COVID-19 Vaccine (3 - Booster for Pfizer series) 11/14/2021          Current Outpatient Medications:   •  atorvastatin (LIPITOR) 20 MG tablet, Take 20 mg by mouth Daily., Disp: , Rfl:   •  Janumet  MG per tablet, TAKE ONE TABLET BY MOUTH TWICE DAILY WITH MEALS, Disp: 60 tablet, Rfl: 2  •  pantoprazole (Protonix) 40 MG EC tablet, 1 tablet, Disp: , Rfl:   •  clonazePAM (KlonoPIN) 1 MG tablet, Take 0.5 tablets by mouth 2 (Two) Times a Day As Needed for Anxiety., Disp: 15 tablet, Rfl: 0    Medications Discontinued During This Encounter   Medication Reason   • acetaminophen (TYLENOL) 650 MG/20.3ML solution solution *Therapy completed   • atorvastatin (LIPITOR) 20 MG tablet *Therapy completed   • etodolac (LODINE) 400 MG tablet *Therapy completed   • FLUoxetine (PROzac) 40 MG capsule *Therapy completed   • hydrOXYzine (ATARAX) 50 MG tablet *Therapy completed   • gabapentin (NEURONTIN) 300 MG capsule *Therapy completed   • Misc Natural Products (OSTEO BI-FLEX ADV TRIPLE ST PO) *Therapy completed   • Omega-3 Fatty Acids (fish oil) 1000 MG capsule capsule *Therapy completed       Immunization History   Administered Date(s) Administered   • COVID-19 (PFIZER) 04/23/2021, 05/14/2021   • Hepatitis A 04/01/2018, 10/01/2018   • Influenza, Unspecified 10/20/2020       Review of Systems   Cardiovascular: Negative for chest pain.   Psychiatric/Behavioral: Positive for stress. Negative for hallucinations and suicidal ideas.  The patient is nervous/anxious.         Objective       Vitals:    12/07/21 1108   BP: 134/66   Pulse:    Resp:    Temp:    SpO2:      Body mass index is 32.33 kg/m².         Physical Exam  Constitutional:       Appearance: Normal appearance.   HENT:      Head: Normocephalic.   Pulmonary:      Effort: Pulmonary effort is normal.   Skin:     Findings: No bruising.   Neurological:      General: No focal deficit present.      Mental Status: She is alert and oriented to person, place, and time.   Psychiatric:         Mood and Affect: Mood normal.         Behavior: Behavior normal.         Thought Content: Thought content normal.         Judgment: Judgment normal.             Result Review :     The following data was reviewed by: GEMINI James on 12/07/2021:    Amphetamine Screen, Urine   Date Value Ref Range Status   12/07/2021 Negative Negative Final     AMP INTERNAL CONTROL   Date Value Ref Range Status   12/07/2021 Passed Passed Final     Barbiturates Screen, Urine   Date Value Ref Range Status   12/07/2021 Negative Negative Final     Buprenorphine, Screen, Urine   Date Value Ref Range Status   12/07/2021 Negative Negative Final     BUPRENORPHINE INTERNAL CONTROL   Date Value Ref Range Status   12/07/2021 Passed Passed Final     Benzodiazepine Screen, Urine   Date Value Ref Range Status   12/07/2021 Negative Negative Final     BENZODIAZEPINE INTERNAL CONTROL   Date Value Ref Range Status   12/07/2021 Passed Passed Final     Cocaine Screen, Urine   Date Value Ref Range Status   12/07/2021 Negative Negative Final     COCAINE INTERNAL CONTROL   Date Value Ref Range Status   12/07/2021 Passed Passed Final     MDMA (ECSTASY)   Date Value Ref Range Status   12/07/2021 Negative Negative Final     MDMA (ECSTASY) INTERNAL CONTROL   Date Value Ref Range Status   12/07/2021 Passed Passed Final     Methamphetamine, Ur   Date Value Ref Range Status   12/07/2021 Negative Negative Final     METHAMPHETAMINE INTERNAL  CONTROL   Date Value Ref Range Status   12/07/2021 Passed Passed Final     Methadone Screen, Urine   Date Value Ref Range Status   12/07/2021 Negative Negative Final     OPIATES INTERNAL CONTROL   Date Value Ref Range Status   12/07/2021 Passed Passed Final     Oxycodone Screen, Urine   Date Value Ref Range Status   12/07/2021 Negative Negative Final     OXYCODONE INTERNAL CONTROL   Date Value Ref Range Status   12/07/2021 Passed Passed Final     PHENCYCLIDINE INTERNAL CONTROL   Date Value Ref Range Status   12/07/2021 Passed Passed Final     THC, Screen, Urine   Date Value Ref Range Status   12/07/2021 Negative Negative Final     THC INTERNAL CONTROL   Date Value Ref Range Status   12/07/2021 Passed Passed Final     Lot Number   Date Value Ref Range Status   12/07/2021 [id number]  Final     Expiration Date   Date Value Ref Range Status   12/07/2021 4/30/22  Final                    Assessment and Plan      Diagnoses and all orders for this visit:    1. LEELEE (generalized anxiety disorder) (Primary)  -     clonazePAM (KlonoPIN) 1 MG tablet; Take 0.5 tablets by mouth 2 (Two) Times a Day As Needed for Anxiety.  Dispense: 15 tablet; Refill: 0    2. Medication monitoring encounter  -     POC Urine Drug Screen Premier Bio-Cup            Follow Up     Return if symptoms worsen or fail to improve.  Consent signed, CARINE done and UDS is neg.  She is aware of the risk and benefits of the med.  Patient was given instructions and counseling regarding her condition or for health maintenance advice. Please see specific information pulled into the AVS if appropriate.   GEMINI James

## 2021-12-31 PROCEDURE — U0004 COV-19 TEST NON-CDC HGH THRU: HCPCS | Performed by: FAMILY MEDICINE

## 2022-01-01 ENCOUNTER — TELEPHONE (OUTPATIENT)
Dept: URGENT CARE | Facility: CLINIC | Age: 67
End: 2022-01-01

## 2022-01-01 DIAGNOSIS — U07.1 COVID-19: Primary | ICD-10-CM

## 2022-01-01 NOTE — TELEPHONE ENCOUNTER
Spoke with patient via telephone.  Verify the patient's name date of birth, and street address.  Patient notified that her test results for COVID-19 were positive.  Discussed quarantine, symptomatic management, and ER precautions with the patient.  Patient did question that her initial results that she saw were negative and now were told that her that the results are positive.  I did discuss with the patient the test results and how they work and that the results that have been given are showing that she is positive.  Patient states that she is still sick and not feeling well.  All questions answered and patient verbalized understanding of information

## 2022-01-12 RX ORDER — ALBUTEROL SULFATE 90 UG/1
2 AEROSOL, METERED RESPIRATORY (INHALATION) EVERY 4 HOURS PRN
Qty: 8 G | Refills: 2 | Status: SHIPPED | OUTPATIENT
Start: 2022-01-12 | End: 2023-03-06 | Stop reason: SDUPTHER

## 2022-01-12 RX ORDER — ALBUTEROL SULFATE 90 UG/1
2 AEROSOL, METERED RESPIRATORY (INHALATION) EVERY 4 HOURS PRN
COMMUNITY
End: 2022-01-12 | Stop reason: SDUPTHER

## 2022-04-20 DIAGNOSIS — E11.65 TYPE 2 DIABETES MELLITUS WITH HYPERGLYCEMIA, WITHOUT LONG-TERM CURRENT USE OF INSULIN: ICD-10-CM

## 2022-04-20 RX ORDER — ATORVASTATIN CALCIUM 20 MG/1
TABLET, FILM COATED ORAL
Qty: 30 TABLET | Refills: 2 | OUTPATIENT
Start: 2022-04-20

## 2022-04-20 RX ORDER — PANTOPRAZOLE SODIUM 40 MG/1
TABLET, DELAYED RELEASE ORAL
Qty: 30 TABLET | Refills: 2 | OUTPATIENT
Start: 2022-04-20

## 2022-04-20 RX ORDER — SITAGLIPTIN AND METFORMIN HYDROCHLORIDE 1000; 50 MG/1; MG/1
TABLET, FILM COATED ORAL
Qty: 60 TABLET | Refills: 2 | OUTPATIENT
Start: 2022-04-20

## 2022-05-09 ENCOUNTER — TELEPHONE (OUTPATIENT)
Dept: FAMILY MEDICINE CLINIC | Facility: CLINIC | Age: 67
End: 2022-05-09

## 2022-05-09 NOTE — TELEPHONE ENCOUNTER
There is some orders in the chart. Not released. They were ordered 11/2021. Will they need to be cancelled and new orders placed?

## 2022-05-09 NOTE — TELEPHONE ENCOUNTER
She can go get her labs done on May 27--her labs are future so they are ready for her to get done after that date.  Her appointment with me is 5/31/2022

## 2022-05-09 NOTE — TELEPHONE ENCOUNTER
Caller: Abdiaziz Krishna    Relationship: Self    Best call back number: [phone]    What orders are you requesting (i.e. lab or imaging):ORDERS FOR LABS FOR UPCOMING APPOINTMENT    In what timeframe would the patient need to come in: ASA    Where will you receive your lab/imaging services: [facility]    Additional notes:PATIENT IS OVERDUE FOR LABS. SHE WOULD LIKE TO HAVE THESE BEFORE HER APPOINTMENT SO THEY CAN BE GONE OVER ON HER VISIT.

## 2022-05-25 ENCOUNTER — LAB (OUTPATIENT)
Dept: LAB | Facility: HOSPITAL | Age: 67
End: 2022-05-25

## 2022-05-25 DIAGNOSIS — E11.65 TYPE 2 DIABETES MELLITUS WITH HYPERGLYCEMIA, WITHOUT LONG-TERM CURRENT USE OF INSULIN: ICD-10-CM

## 2022-05-25 DIAGNOSIS — I10 PRIMARY HYPERTENSION: ICD-10-CM

## 2022-05-25 LAB
ALBUMIN SERPL-MCNC: 4 G/DL (ref 3.5–5.2)
ALBUMIN/GLOB SERPL: 1.2 G/DL
ALP SERPL-CCNC: 98 U/L (ref 39–117)
ALT SERPL W P-5'-P-CCNC: 107 U/L (ref 1–33)
ANION GAP SERPL CALCULATED.3IONS-SCNC: 15.4 MMOL/L (ref 5–15)
AST SERPL-CCNC: 75 U/L (ref 1–32)
BASOPHILS # BLD AUTO: 0.06 10*3/MM3 (ref 0–0.2)
BASOPHILS NFR BLD AUTO: 1.2 % (ref 0–1.5)
BILIRUB SERPL-MCNC: 0.3 MG/DL (ref 0–1.2)
BUN SERPL-MCNC: 14 MG/DL (ref 8–23)
BUN/CREAT SERPL: 13.7 (ref 7–25)
CALCIUM SPEC-SCNC: 9.7 MG/DL (ref 8.6–10.5)
CHLORIDE SERPL-SCNC: 101 MMOL/L (ref 98–107)
CHOLEST SERPL-MCNC: 200 MG/DL (ref 0–200)
CO2 SERPL-SCNC: 22.6 MMOL/L (ref 22–29)
CREAT SERPL-MCNC: 1.02 MG/DL (ref 0.57–1)
DEPRECATED RDW RBC AUTO: 38.8 FL (ref 37–54)
EGFRCR SERPLBLD CKD-EPI 2021: 60.4 ML/MIN/1.73
EOSINOPHIL # BLD AUTO: 0.18 10*3/MM3 (ref 0–0.4)
EOSINOPHIL NFR BLD AUTO: 3.6 % (ref 0.3–6.2)
ERYTHROCYTE [DISTWIDTH] IN BLOOD BY AUTOMATED COUNT: 11.9 % (ref 12.3–15.4)
GLOBULIN UR ELPH-MCNC: 3.4 GM/DL
GLUCOSE SERPL-MCNC: 282 MG/DL (ref 65–99)
HBA1C MFR BLD: 12.4 % (ref 4.8–5.6)
HCT VFR BLD AUTO: 42.5 % (ref 34–46.6)
HDLC SERPL-MCNC: 33 MG/DL (ref 40–60)
HGB BLD-MCNC: 14.4 G/DL (ref 12–15.9)
IMM GRANULOCYTES # BLD AUTO: 0.02 10*3/MM3 (ref 0–0.05)
IMM GRANULOCYTES NFR BLD AUTO: 0.4 % (ref 0–0.5)
LDLC SERPL CALC-MCNC: 117 MG/DL (ref 0–100)
LDLC/HDLC SERPL: 3.32 {RATIO}
LYMPHOCYTES # BLD AUTO: 1.51 10*3/MM3 (ref 0.7–3.1)
LYMPHOCYTES NFR BLD AUTO: 30.5 % (ref 19.6–45.3)
MCH RBC QN AUTO: 30.6 PG (ref 26.6–33)
MCHC RBC AUTO-ENTMCNC: 33.9 G/DL (ref 31.5–35.7)
MCV RBC AUTO: 90.4 FL (ref 79–97)
MONOCYTES # BLD AUTO: 0.4 10*3/MM3 (ref 0.1–0.9)
MONOCYTES NFR BLD AUTO: 8.1 % (ref 5–12)
NEUTROPHILS NFR BLD AUTO: 2.78 10*3/MM3 (ref 1.7–7)
NEUTROPHILS NFR BLD AUTO: 56.2 % (ref 42.7–76)
NRBC BLD AUTO-RTO: 0 /100 WBC (ref 0–0.2)
PLATELET # BLD AUTO: 253 10*3/MM3 (ref 140–450)
PMV BLD AUTO: 10.5 FL (ref 6–12)
POTASSIUM SERPL-SCNC: 4.6 MMOL/L (ref 3.5–5.2)
PROT SERPL-MCNC: 7.4 G/DL (ref 6–8.5)
RBC # BLD AUTO: 4.7 10*6/MM3 (ref 3.77–5.28)
SODIUM SERPL-SCNC: 139 MMOL/L (ref 136–145)
TRIGL SERPL-MCNC: 288 MG/DL (ref 0–150)
TSH SERPL DL<=0.05 MIU/L-ACNC: 3.53 UIU/ML (ref 0.27–4.2)
VLDLC SERPL-MCNC: 50 MG/DL (ref 5–40)
WBC NRBC COR # BLD: 4.95 10*3/MM3 (ref 3.4–10.8)

## 2022-05-25 PROCEDURE — 80061 LIPID PANEL: CPT

## 2022-05-25 PROCEDURE — 80053 COMPREHEN METABOLIC PANEL: CPT

## 2022-05-25 PROCEDURE — 85025 COMPLETE CBC W/AUTO DIFF WBC: CPT

## 2022-05-25 PROCEDURE — 36415 COLL VENOUS BLD VENIPUNCTURE: CPT

## 2022-05-25 PROCEDURE — 84443 ASSAY THYROID STIM HORMONE: CPT

## 2022-05-25 PROCEDURE — 83036 HEMOGLOBIN GLYCOSYLATED A1C: CPT

## 2022-05-31 ENCOUNTER — OFFICE VISIT (OUTPATIENT)
Dept: FAMILY MEDICINE CLINIC | Facility: CLINIC | Age: 67
End: 2022-05-31

## 2022-05-31 VITALS
SYSTOLIC BLOOD PRESSURE: 130 MMHG | BODY MASS INDEX: 34.02 KG/M2 | HEART RATE: 103 BPM | TEMPERATURE: 97.4 F | RESPIRATION RATE: 20 BRPM | HEIGHT: 69 IN | DIASTOLIC BLOOD PRESSURE: 70 MMHG | OXYGEN SATURATION: 95 % | WEIGHT: 229.7 LBS

## 2022-05-31 DIAGNOSIS — F41.1 GAD (GENERALIZED ANXIETY DISORDER): ICD-10-CM

## 2022-05-31 DIAGNOSIS — K21.9 GASTROESOPHAGEAL REFLUX DISEASE WITHOUT ESOPHAGITIS: ICD-10-CM

## 2022-05-31 DIAGNOSIS — E78.2 MIXED HYPERLIPIDEMIA: ICD-10-CM

## 2022-05-31 DIAGNOSIS — Z11.59 ENCOUNTER FOR HEPATITIS C SCREENING TEST FOR LOW RISK PATIENT: ICD-10-CM

## 2022-05-31 DIAGNOSIS — Z51.81 ENCOUNTER FOR MEDICATION MONITORING: ICD-10-CM

## 2022-05-31 DIAGNOSIS — I10 ESSENTIAL HYPERTENSION: ICD-10-CM

## 2022-05-31 DIAGNOSIS — E11.65 TYPE 2 DIABETES MELLITUS WITH HYPERGLYCEMIA, WITHOUT LONG-TERM CURRENT USE OF INSULIN: Primary | ICD-10-CM

## 2022-05-31 PROCEDURE — 80305 DRUG TEST PRSMV DIR OPT OBS: CPT | Performed by: NURSE PRACTITIONER

## 2022-05-31 PROCEDURE — 99214 OFFICE O/P EST MOD 30 MIN: CPT | Performed by: NURSE PRACTITIONER

## 2022-05-31 RX ORDER — ATORVASTATIN CALCIUM 20 MG/1
20 TABLET, FILM COATED ORAL DAILY
Qty: 90 TABLET | Refills: 1 | Status: SHIPPED | OUTPATIENT
Start: 2022-05-31 | End: 2022-08-29 | Stop reason: SDUPTHER

## 2022-05-31 RX ORDER — SITAGLIPTIN AND METFORMIN HYDROCHLORIDE 1000; 50 MG/1; MG/1
1 TABLET, FILM COATED ORAL 2 TIMES DAILY WITH MEALS
Qty: 180 TABLET | Refills: 1 | Status: SHIPPED | OUTPATIENT
Start: 2022-05-31 | End: 2022-08-29 | Stop reason: SDUPTHER

## 2022-05-31 RX ORDER — CLONAZEPAM 1 MG/1
0.5 TABLET ORAL 2 TIMES DAILY PRN
Qty: 15 TABLET | Refills: 0 | Status: SHIPPED | OUTPATIENT
Start: 2022-05-31 | End: 2022-12-06 | Stop reason: SDUPTHER

## 2022-05-31 RX ORDER — PANTOPRAZOLE SODIUM 40 MG/1
40 TABLET, DELAYED RELEASE ORAL DAILY
Qty: 90 TABLET | Refills: 1 | Status: SHIPPED | OUTPATIENT
Start: 2022-05-31 | End: 2022-08-29 | Stop reason: SDUPTHER

## 2022-05-31 NOTE — PROGRESS NOTES
Answers for HPI/ROS submitted by the patient on 5/24/2022  What is the primary reason for your visit?: Diabetes  Diabetes type: type 2  MedicAlert ID: No  Disease duration: 3 years  polyphagia: Yes  Symptom course: worsening  hunger: Yes  nocturnal hypoglycemia: Yes  retinopathy: Yes  CAD risks: hypertension  Current treatments: oral agent (monotherapy)  Treatment compliance: most of the time  Home urines: <1 x per month  Monitoring compliance: poor  Weight trend: increasing steadily  Current diet: generally healthy  Meal planning: avoidance of concentrated sweets  Exercise: intermittently  Dietitian visit: No  Eye exam current: Yes  Sees podiatrist: No    Chief Complaint  Diabetes, Hyperlipidemia, and Medicare Wellness-subsequent    Subjective          Abdiaziz Krishna presents to [facility]  History of Present Illness  She is here for her biometric papers to be signed.  She has started back on her keto though she is since having COVID after she returned from [place] she does has not had the motivation.  She gets flutters every now and then.  She just has not wanted to eat like she should.  She has been taking her Lipitor and Janumet.  She is not having any chest pain or shortness of breath.  She still has a smell that she cannot get out of her nose at times.  She is also taking her Protonix and her Lipitor.  She and her  are going to be moving down to [place] full-time in November they have already bought a house.  She did have to have the Klonopin when she went for a flight which she would like a refill.  She still has a few pills left from her prescription in December.  She still takes her hydroxyzine around in town.        Allergies  Patient has no known allergies.    Social History     Tobacco Use   • Smoking status: Former Smoker     Packs/day: 2.00     Years: 46.00     Pack years: 92.00     Types: Cigarettes, Cigarettes     Start date: 1/1/1970     Quit date: 1/1/2016  "    Years since quittin.4   • Smokeless tobacco: Never Used   • Tobacco comment: SMOKED FOR 35-40 YEARS QUIT 4 YEARS AGO   Substance Use Topics   • Alcohol use: Not Currently     Comment: WAS HEAVY DRINKER FOR YEARS NOW RARELY   • Drug use: Never       Family History   Problem Relation Age of Onset   • Emphysema Mother    • Heart failure Mother    • Arthritis Mother    • Heart disease Father    • Diabetes Father    • Aneurysm Father         ABDOMINAL DECD AGE 88   • Diabetes Daughter         gestational        Health Maintenance Due   Topic Date Due   • ANNUAL PHYSICAL  Never done   • HEPATITIS C SCREENING  Never done   • DIABETIC FOOT EXAM  Never done   • DIABETIC EYE EXAM  Never done        Immunization History   Administered Date(s) Administered   • COVID-19 (PFIZER) PURPLE CAP 2021, 2021   • Fluad Quad 65+ 10/12/2021   • Hepatitis A 2018, 10/01/2018   • Influenza, Unspecified 10/20/2020   • Pneumococcal Polysaccharide (PPSV23) 2021       Review of Systems   Constitutional: Negative for fatigue.   Respiratory: Negative for shortness of breath.    Cardiovascular: Negative for chest pain.   Endocrine: Positive for polyphagia.        Objective       Vitals:    22 1343   BP: 130/70   Pulse: 103   Resp: 20   Temp: 97.4 °F (36.3 °C)   SpO2: 95%   Weight: 104 kg (229 lb 11.2 oz)   Height: 175.3 cm (69\")       Body mass index is 33.92 kg/m².         Physical Exam  Vitals reviewed.   Constitutional:       Appearance: Normal appearance. She is well-developed.   Cardiovascular:      Rate and Rhythm: Normal rate and regular rhythm.      Heart sounds: Normal heart sounds. No murmur heard.  Pulmonary:      Effort: Pulmonary effort is normal.      Breath sounds: Normal breath sounds.   Neurological:      Mental Status: She is alert and oriented to person, place, and time.      Cranial Nerves: No cranial nerve deficit.      Motor: No weakness.   Psychiatric:         Mood and Affect: Mood and " affect normal.             Result Review :     The following data was reviewed by: GEMINI James on 05/31/2022:    Common labs    Common Labsle 8/11/21 8/11/21 8/11/21 8/11/21 8/11/21 11/1/21 11/1/21 11/1/21 11/1/21 11/1/21 5/25/22 5/25/22 5/25/22 5/25/22    0933 0933 0933 0933 0934 0836 0836 0836 0836 0907 0828 0828 0828 0828   Glucose  300 (A)      132 (A)     282 (A)    BUN  10      16     14    Creatinine  0.69      0.91     1.02 (A)    eGFR Non  Am  85      62         Sodium  136      138     139    Potassium  4.9      4.0     4.6    Chloride  100      103     101    Calcium  9.4      9.3     9.7    Albumin  4.20      4.30     4.00    Total Bilirubin  0.6      0.4     0.3    Alkaline Phosphatase  127 (A)      95     98    AST (SGOT)  90 (A)      25     75 (A)    ALT (SGPT)  136 (A)      42 (A)     107 (A)    WBC 4.67     7.16     4.95      Hemoglobin 14.8     13.7     14.4      Hematocrit 46.5     41.3     42.5      Platelets 231     284     253      Total Cholesterol   192      91     200   Triglycerides   241 (A)      123     288 (A)   HDL Cholesterol   35 (A)      37 (A)     33 (A)   LDL Cholesterol    115 (A)      32     117 (A)   Hemoglobin A1C    12.30 (A)   8.46 (A)     12.40 (A)     Microalbumin, Urine     1.5     1.4       (A) Abnormal value            Most Recent A1C    HGBA1C Most Recent 5/25/22   Hemoglobin A1C 12.40 (A)   (A) Abnormal value                           Assessment and Plan      Diagnoses and all orders for this visit:    1. Type 2 diabetes mellitus with hyperglycemia, without long-term current use of insulin (HCC) (Primary)  -     sitaGLIPtin-metFORMIN (Janumet)  MG per tablet; Take 1 tablet by mouth 2 (Two) Times a Day With Meals.  Dispense: 180 tablet; Refill: 1  -     Hemoglobin A1c; Future  -     MicroAlbumin, Urine, Random - Urine, Clean Catch; Future  -     empagliflozin (Jardiance) 25 MG tablet tablet; Take 1 tablet by mouth Daily.  Dispense: 30  tablet; Refill: 2    2. LEELEE (generalized anxiety disorder)  -     clonazePAM (KlonoPIN) 1 MG tablet; Take 0.5 tablets by mouth 2 (Two) Times a Day As Needed for Anxiety.  Dispense: 15 tablet; Refill: 0    3. Encounter for medication monitoring  -     POC Urine Drug Screen Premier Bio-Cup    4. Essential hypertension  -     CBC & Differential; Future  -     Comprehensive Metabolic Panel; Future  -     TSH; Future  -     Lipid Panel; Future    5. Mixed hyperlipidemia  -     atorvastatin (LIPITOR) 20 MG tablet; Take 1 tablet by mouth Daily.  Dispense: 90 tablet; Refill: 1  -     CBC & Differential; Future  -     Comprehensive Metabolic Panel; Future  -     TSH; Future  -     Lipid Panel; Future    6. Gastroesophageal reflux disease without esophagitis  -     pantoprazole (PROTONIX) 40 MG EC tablet; Take 1 tablet by mouth Daily.  Dispense: 90 tablet; Refill: 1    7. Encounter for hepatitis C screening test for low risk patient  -     Hepatitis C antibody; Future            Follow Up     Return in about 3 months (around 8/31/2022).  Follow-up in 3 months for labs and appt. Call with any concerns or questions that you may have regarding your medications or history.    We will add jardiance to her med to help with the sugar.  She will cont to work on the low carb/keto.  [facility] urine drug screen and consent on chart.  Patient was given instructions and counseling regarding her condition or for health maintenance advice. Please see specific information pulled into the AVS if appropriate.         Anahi Flores, APRN  05/31/2022

## 2022-07-11 ENCOUNTER — PATIENT MESSAGE (OUTPATIENT)
Dept: FAMILY MEDICINE CLINIC | Facility: CLINIC | Age: 67
End: 2022-07-11

## 2022-07-11 DIAGNOSIS — R30.0 DYSURIA: Primary | ICD-10-CM

## 2022-07-19 ENCOUNTER — LAB (OUTPATIENT)
Dept: LAB | Facility: HOSPITAL | Age: 67
End: 2022-07-19

## 2022-07-19 DIAGNOSIS — R30.0 DYSURIA: ICD-10-CM

## 2022-07-19 LAB
BACTERIA UR QL AUTO: ABNORMAL /HPF
BILIRUB UR QL STRIP: NEGATIVE
CLARITY UR: ABNORMAL
COLOR UR: YELLOW
GLUCOSE UR STRIP-MCNC: ABNORMAL MG/DL
HGB UR QL STRIP.AUTO: ABNORMAL
HYALINE CASTS UR QL AUTO: ABNORMAL /LPF
KETONES UR QL STRIP: NEGATIVE
LEUKOCYTE ESTERASE UR QL STRIP.AUTO: ABNORMAL
NITRITE UR QL STRIP: POSITIVE
PH UR STRIP.AUTO: 5.5 [PH] (ref 5–8)
PROT UR QL STRIP: ABNORMAL
RBC # UR STRIP: ABNORMAL /HPF
REF LAB TEST METHOD: ABNORMAL
SP GR UR STRIP: 1.02 (ref 1–1.03)
SQUAMOUS #/AREA URNS HPF: ABNORMAL /HPF
UROBILINOGEN UR QL STRIP: ABNORMAL
WBC # UR STRIP: ABNORMAL /HPF

## 2022-07-19 PROCEDURE — 81001 URINALYSIS AUTO W/SCOPE: CPT

## 2022-07-20 RX ORDER — SULFAMETHOXAZOLE AND TRIMETHOPRIM 800; 160 MG/1; MG/1
1 TABLET ORAL 2 TIMES DAILY
Qty: 20 TABLET | Refills: 0 | Status: SHIPPED | OUTPATIENT
Start: 2022-07-20 | End: 2022-07-30

## 2022-08-04 RX ORDER — HYDROXYZINE 50 MG/1
50 TABLET, FILM COATED ORAL 3 TIMES DAILY PRN
COMMUNITY
End: 2022-08-04 | Stop reason: SDUPTHER

## 2022-08-04 RX ORDER — HYDROXYZINE 50 MG/1
50 TABLET, FILM COATED ORAL 3 TIMES DAILY PRN
Qty: 90 TABLET | Refills: 1 | Status: SHIPPED | OUTPATIENT
Start: 2022-08-04 | End: 2022-08-29 | Stop reason: SDUPTHER

## 2022-08-23 ENCOUNTER — LAB (OUTPATIENT)
Dept: LAB | Facility: HOSPITAL | Age: 67
End: 2022-08-23

## 2022-08-23 DIAGNOSIS — E11.65 TYPE 2 DIABETES MELLITUS WITH HYPERGLYCEMIA, WITHOUT LONG-TERM CURRENT USE OF INSULIN: ICD-10-CM

## 2022-08-23 DIAGNOSIS — Z11.59 ENCOUNTER FOR HEPATITIS C SCREENING TEST FOR LOW RISK PATIENT: ICD-10-CM

## 2022-08-23 DIAGNOSIS — E78.2 MIXED HYPERLIPIDEMIA: ICD-10-CM

## 2022-08-23 DIAGNOSIS — I10 ESSENTIAL HYPERTENSION: ICD-10-CM

## 2022-08-23 LAB
ALBUMIN SERPL-MCNC: 4.5 G/DL (ref 3.5–5.2)
ALBUMIN UR-MCNC: <1.2 MG/DL
ALBUMIN/GLOB SERPL: 1.8 G/DL
ALP SERPL-CCNC: 81 U/L (ref 39–117)
ALT SERPL W P-5'-P-CCNC: 49 U/L (ref 1–33)
ANION GAP SERPL CALCULATED.3IONS-SCNC: 17.6 MMOL/L (ref 5–15)
AST SERPL-CCNC: 34 U/L (ref 1–32)
BASOPHILS # BLD AUTO: 0.06 10*3/MM3 (ref 0–0.2)
BASOPHILS NFR BLD AUTO: 0.8 % (ref 0–1.5)
BILIRUB SERPL-MCNC: 0.4 MG/DL (ref 0–1.2)
BUN SERPL-MCNC: 18 MG/DL (ref 8–23)
BUN/CREAT SERPL: 17.3 (ref 7–25)
CALCIUM SPEC-SCNC: 9.8 MG/DL (ref 8.6–10.5)
CHLORIDE SERPL-SCNC: 101 MMOL/L (ref 98–107)
CHOLEST SERPL-MCNC: 97 MG/DL (ref 0–200)
CO2 SERPL-SCNC: 22.4 MMOL/L (ref 22–29)
CREAT SERPL-MCNC: 1.04 MG/DL (ref 0.57–1)
DEPRECATED RDW RBC AUTO: 39.3 FL (ref 37–54)
EGFRCR SERPLBLD CKD-EPI 2021: 59 ML/MIN/1.73
EOSINOPHIL # BLD AUTO: 0.22 10*3/MM3 (ref 0–0.4)
EOSINOPHIL NFR BLD AUTO: 3 % (ref 0.3–6.2)
ERYTHROCYTE [DISTWIDTH] IN BLOOD BY AUTOMATED COUNT: 12.1 % (ref 12.3–15.4)
GLOBULIN UR ELPH-MCNC: 2.5 GM/DL
GLUCOSE SERPL-MCNC: 145 MG/DL (ref 65–99)
HBA1C MFR BLD: 8 % (ref 4.8–5.6)
HCT VFR BLD AUTO: 43.7 % (ref 34–46.6)
HCV AB SER DONR QL: NORMAL
HDLC SERPL-MCNC: 32 MG/DL (ref 40–60)
HGB BLD-MCNC: 14.7 G/DL (ref 12–15.9)
IMM GRANULOCYTES # BLD AUTO: 0.02 10*3/MM3 (ref 0–0.05)
IMM GRANULOCYTES NFR BLD AUTO: 0.3 % (ref 0–0.5)
LDLC SERPL CALC-MCNC: 41 MG/DL (ref 0–100)
LDLC/HDLC SERPL: 1.16 {RATIO}
LYMPHOCYTES # BLD AUTO: 1.98 10*3/MM3 (ref 0.7–3.1)
LYMPHOCYTES NFR BLD AUTO: 27.2 % (ref 19.6–45.3)
MCH RBC QN AUTO: 30.2 PG (ref 26.6–33)
MCHC RBC AUTO-ENTMCNC: 33.6 G/DL (ref 31.5–35.7)
MCV RBC AUTO: 89.7 FL (ref 79–97)
MONOCYTES # BLD AUTO: 0.6 10*3/MM3 (ref 0.1–0.9)
MONOCYTES NFR BLD AUTO: 8.2 % (ref 5–12)
NEUTROPHILS NFR BLD AUTO: 4.4 10*3/MM3 (ref 1.7–7)
NEUTROPHILS NFR BLD AUTO: 60.5 % (ref 42.7–76)
NRBC BLD AUTO-RTO: 0 /100 WBC (ref 0–0.2)
PLATELET # BLD AUTO: 301 10*3/MM3 (ref 140–450)
PMV BLD AUTO: 10.4 FL (ref 6–12)
POTASSIUM SERPL-SCNC: 4.9 MMOL/L (ref 3.5–5.2)
PROT SERPL-MCNC: 7 G/DL (ref 6–8.5)
RBC # BLD AUTO: 4.87 10*6/MM3 (ref 3.77–5.28)
SODIUM SERPL-SCNC: 141 MMOL/L (ref 136–145)
TRIGL SERPL-MCNC: 139 MG/DL (ref 0–150)
TSH SERPL DL<=0.05 MIU/L-ACNC: 1.96 UIU/ML (ref 0.27–4.2)
VLDLC SERPL-MCNC: 24 MG/DL (ref 5–40)
WBC NRBC COR # BLD: 7.28 10*3/MM3 (ref 3.4–10.8)

## 2022-08-23 PROCEDURE — 84443 ASSAY THYROID STIM HORMONE: CPT

## 2022-08-23 PROCEDURE — 80053 COMPREHEN METABOLIC PANEL: CPT

## 2022-08-23 PROCEDURE — 82043 UR ALBUMIN QUANTITATIVE: CPT

## 2022-08-23 PROCEDURE — 80061 LIPID PANEL: CPT

## 2022-08-23 PROCEDURE — 36415 COLL VENOUS BLD VENIPUNCTURE: CPT

## 2022-08-23 PROCEDURE — 83036 HEMOGLOBIN GLYCOSYLATED A1C: CPT

## 2022-08-23 PROCEDURE — 85025 COMPLETE CBC W/AUTO DIFF WBC: CPT

## 2022-08-23 PROCEDURE — 86803 HEPATITIS C AB TEST: CPT

## 2022-08-24 DIAGNOSIS — E11.65 TYPE 2 DIABETES MELLITUS WITH HYPERGLYCEMIA, WITHOUT LONG-TERM CURRENT USE OF INSULIN: ICD-10-CM

## 2022-08-24 RX ORDER — EMPAGLIFLOZIN 25 MG/1
TABLET, FILM COATED ORAL
Qty: 30 TABLET | Refills: 0 | Status: SHIPPED | OUTPATIENT
Start: 2022-08-24 | End: 2022-08-29 | Stop reason: SDUPTHER

## 2022-08-29 ENCOUNTER — OFFICE VISIT (OUTPATIENT)
Dept: FAMILY MEDICINE CLINIC | Facility: CLINIC | Age: 67
End: 2022-08-29

## 2022-08-29 VITALS
BODY MASS INDEX: 32.07 KG/M2 | RESPIRATION RATE: 12 BRPM | DIASTOLIC BLOOD PRESSURE: 80 MMHG | TEMPERATURE: 97.3 F | WEIGHT: 216.5 LBS | OXYGEN SATURATION: 97 % | SYSTOLIC BLOOD PRESSURE: 110 MMHG | HEART RATE: 81 BPM | HEIGHT: 69 IN

## 2022-08-29 DIAGNOSIS — E78.2 MIXED HYPERLIPIDEMIA: ICD-10-CM

## 2022-08-29 DIAGNOSIS — I10 ESSENTIAL HYPERTENSION: Primary | ICD-10-CM

## 2022-08-29 DIAGNOSIS — K21.9 GASTROESOPHAGEAL REFLUX DISEASE WITHOUT ESOPHAGITIS: ICD-10-CM

## 2022-08-29 DIAGNOSIS — E11.65 TYPE 2 DIABETES MELLITUS WITH HYPERGLYCEMIA, WITHOUT LONG-TERM CURRENT USE OF INSULIN: ICD-10-CM

## 2022-08-29 DIAGNOSIS — F41.9 ANXIETY: ICD-10-CM

## 2022-08-29 PROCEDURE — 99214 OFFICE O/P EST MOD 30 MIN: CPT | Performed by: NURSE PRACTITIONER

## 2022-08-29 RX ORDER — PANTOPRAZOLE SODIUM 40 MG/1
40 TABLET, DELAYED RELEASE ORAL DAILY
Qty: 90 TABLET | Refills: 1 | Status: SHIPPED | OUTPATIENT
Start: 2022-08-29 | End: 2023-03-06 | Stop reason: SDUPTHER

## 2022-08-29 RX ORDER — SITAGLIPTIN AND METFORMIN HYDROCHLORIDE 1000; 50 MG/1; MG/1
1 TABLET, FILM COATED ORAL 2 TIMES DAILY WITH MEALS
Qty: 180 TABLET | Refills: 1 | Status: SHIPPED | OUTPATIENT
Start: 2022-08-29 | End: 2023-03-06 | Stop reason: SDUPTHER

## 2022-08-29 RX ORDER — HYDROXYZINE 50 MG/1
50 TABLET, FILM COATED ORAL 3 TIMES DAILY PRN
Qty: 90 TABLET | Refills: 1 | Status: SHIPPED | OUTPATIENT
Start: 2022-08-29 | End: 2023-03-06 | Stop reason: SDUPTHER

## 2022-08-29 RX ORDER — ATORVASTATIN CALCIUM 20 MG/1
20 TABLET, FILM COATED ORAL DAILY
Qty: 90 TABLET | Refills: 1 | Status: SHIPPED | OUTPATIENT
Start: 2022-08-29 | End: 2023-03-06 | Stop reason: SDUPTHER

## 2022-08-29 NOTE — PROGRESS NOTES
Answers for HPI/ROS submitted by the patient on 2022  Please describe your symptoms.: follow up  Have you had these symptoms before?: Yes  How long have you been having these symptoms?: Greater than 2 weeks  What is the primary reason for your visit?: Other    Chief Complaint  Diabetes and Hyperlipidemia    Subjective          Abdiaziz Krishna presents to [facility]  History of Present Illness  She has been watching her diet and has lost 13 pounds back with keto.  She has brought her A1c down from 12 to 8.0.  She is doing very good overall.  She is needing refills today.  She is doing well with her cholesterol medicine.  No leg issues noted.  She is taking the atorvastatin on a daily basis.  She does not need a refill of Klonopin currently.  She uses that only when she goes to [place].  She is taking her Protonix on a daily basis.  She just had her eyes checked 2022 and they are doing well.  She is checking her sugars.  She is taking Janumet and Jardiance.      Allergies  Patient has no known allergies.    Social History     Tobacco Use   • Smoking status: Former Smoker     Packs/day: 2.00     Years: 46.00     Pack years: 92.00     Types: Cigarettes     Start date: 1970     Quit date: 2016     Years since quittin.6   • Smokeless tobacco: Never Used   • Tobacco comment: SMOKED FOR 35-40 YEARS QUIT 4 YEARS AGO   Substance Use Topics   • Alcohol use: Not Currently     Comment: WAS HEAVY DRINKER FOR YEARS NOW RARELY   • Drug use: Never       Family History   Problem Relation Age of Onset   • Emphysema Mother    • Heart failure Mother    • Arthritis Mother    • Heart disease Father    • Diabetes Father    • Aneurysm Father         ABDOMINAL DECD AGE 88   • Diabetes Daughter         gestational        Health Maintenance Due   Topic Date Due   • ANNUAL PHYSICAL  Never done   • DIABETIC FOOT EXAM  Never done        Immunization History   Administered Date(s)  "Administered   • COVID-19 (PFIZER) PURPLE CAP 04/23/2021, 05/14/2021   • Fluad Quad 65+ 10/12/2021   • Hepatitis A 04/01/2018, 10/01/2018   • Influenza, Unspecified 10/20/2020   • Pneumococcal Polysaccharide (PPSV23) 11/02/2021   • Shingrix 06/01/2022, 08/26/2022       Review of Systems   Constitutional: Negative for fatigue.   Respiratory: Negative for cough and shortness of breath.    Cardiovascular: Negative for chest pain.   Gastrointestinal: Negative for diarrhea, nausea and vomiting.        Objective       Vitals:    08/29/22 1107   BP: 110/80   Pulse: 81   Resp: 12   Temp: 97.3 °F (36.3 °C)   SpO2: 97%   Weight: 98.2 kg (216 lb 8 oz)   Height: 175.3 cm (69\")       Body mass index is 31.97 kg/m².         Physical Exam  Vitals reviewed.   Constitutional:       Appearance: Normal appearance. She is well-developed.   Cardiovascular:      Rate and Rhythm: Normal rate and regular rhythm.      Heart sounds: Normal heart sounds. No murmur heard.  Pulmonary:      Effort: Pulmonary effort is normal.      Breath sounds: Normal breath sounds.   Neurological:      Mental Status: She is alert and oriented to person, place, and time.      Cranial Nerves: No cranial nerve deficit.      Motor: No weakness.   Psychiatric:         Mood and Affect: Mood and affect normal.             Result Review :     The following data was reviewed by: GEMINI James on 08/29/2022:    Common labs    Common Labsle 11/1/21 11/1/21 11/1/21 11/1/21 11/1/21 5/25/22 5/25/22 5/25/22 5/25/22 8/23/22 8/23/22 8/23/22 8/23/22 8/23/22    0836 0836 0836 0836 0907 0828 0828 0828 0828 0942 0942 0942 0942 1006   Glucose   132 (A)     282 (A)    145 (A)     BUN   16     14    18     Creatinine   0.91     1.02 (A)    1.04 (A)     eGFR Non  Am   62              Sodium   138     139    141     Potassium   4.0     4.6    4.9     Chloride   103     101    101     Calcium   9.3     9.7    9.8     Albumin   4.30     4.00    4.50     Total " Bilirubin   0.4     0.3    0.4     Alkaline Phosphatase   95     98    81     AST (SGOT)   25     75 (A)    34 (A)     ALT (SGPT)   42 (A)     107 (A)    49 (A)     WBC 7.16     4.95    7.28       Hemoglobin 13.7     14.4    14.7       Hematocrit 41.3     42.5    43.7       Platelets 284     253    301       Total Cholesterol    91     200    97    Triglycerides    123     288 (A)    139    HDL Cholesterol    37 (A)     33 (A)    32 (A)    LDL Cholesterol     32     117 (A)    41    Hemoglobin A1C  8.46 (A)     12.40 (A)    8.00 (A)      Microalbumin, Urine     1.4         <1.2   (A) Abnormal value            Most Recent A1C    HGBA1C Most Recent 8/23/22   Hemoglobin A1C 8.00 (A)   (A) Abnormal value                           Assessment and Plan      Diagnoses and all orders for this visit:    1. Essential hypertension (Primary)  -     CBC & Differential; Future  -     Comprehensive Metabolic Panel; Future  -     TSH; Future  -     Lipid Panel; Future    2. Type 2 diabetes mellitus with hyperglycemia, without long-term current use of insulin (HCC)  -     empagliflozin (Jardiance) 25 MG tablet tablet; Take 1 tablet by mouth Daily.  Dispense: 90 tablet; Refill: 1  -     sitaGLIPtin-metFORMIN (Janumet)  MG per tablet; Take 1 tablet by mouth 2 (Two) Times a Day With Meals.  Dispense: 180 tablet; Refill: 1  -     Hemoglobin A1c; Future  -     MicroAlbumin, Urine, Random - Urine, Clean Catch; Future    3. Mixed hyperlipidemia  -     atorvastatin (LIPITOR) 20 MG tablet; Take 1 tablet by mouth Daily.  Dispense: 90 tablet; Refill: 1  -     CBC & Differential; Future  -     Comprehensive Metabolic Panel; Future  -     TSH; Future  -     Lipid Panel; Future    4. Gastroesophageal reflux disease without esophagitis  -     pantoprazole (PROTONIX) 40 MG EC tablet; Take 1 tablet by mouth Daily.  Dispense: 90 tablet; Refill: 1    5. Anxiety  -     hydrOXYzine (ATARAX) 50 MG tablet; Take 1 tablet by mouth 3 (Three) Times a  Day As Needed for Anxiety.  Dispense: 90 tablet; Refill: 1            Follow Up     Return in about 6 months (around 2/28/2023) for Will be due a f/up in December for refill of Klonopin before she leaves for [place]. May do [employer].  Follow-up in 6 months for labs and appt. Call with any concerns or questions that you may have regarding your medications or history.    I have reviewed all medications and at this time no medications changes need to be adjusted for all chronic conditions.  We will follow-up in December for refill of Klonopin.  She can either do in office or [facility].  She will get her urine at [facility].  Patient was given instructions and counseling regarding her condition or for health maintenance advice. Please see specific information pulled into the AVS if appropriate.         Anahi Flores, APRN  08/29/2022

## 2022-10-07 ENCOUNTER — PATIENT MESSAGE (OUTPATIENT)
Dept: FAMILY MEDICINE CLINIC | Facility: CLINIC | Age: 67
End: 2022-10-07

## 2022-10-08 RX ORDER — COLCHICINE 0.6 MG/1
TABLET ORAL
Qty: 7 TABLET | Refills: 0 | Status: SHIPPED | OUTPATIENT
Start: 2022-10-08 | End: 2023-03-06

## 2022-10-20 ENCOUNTER — PATIENT MESSAGE (OUTPATIENT)
Dept: FAMILY MEDICINE CLINIC | Facility: CLINIC | Age: 67
End: 2022-10-20

## 2022-10-20 DIAGNOSIS — Z78.0 POSTMENOPAUSAL: Primary | ICD-10-CM

## 2022-10-31 ENCOUNTER — HOSPITAL ENCOUNTER (OUTPATIENT)
Dept: BONE DENSITY | Facility: HOSPITAL | Age: 67
Discharge: HOME | End: 2022-10-31
Admitting: NURSE PRACTITIONER

## 2022-10-31 DIAGNOSIS — Z78.0 POSTMENOPAUSAL: ICD-10-CM

## 2022-10-31 PROCEDURE — 77080 DXA BONE DENSITY AXIAL: CPT

## 2022-12-06 ENCOUNTER — OFFICE VISIT (OUTPATIENT)
Dept: FAMILY MEDICINE CLINIC | Facility: CLINIC | Age: 67
End: 2022-12-06

## 2022-12-06 DIAGNOSIS — Z51.81 MEDICATION MONITORING ENCOUNTER: Primary | ICD-10-CM

## 2022-12-06 DIAGNOSIS — F41.1 GAD (GENERALIZED ANXIETY DISORDER): ICD-10-CM

## 2022-12-06 PROCEDURE — 99213 OFFICE O/P EST LOW 20 MIN: CPT | Performed by: NURSE PRACTITIONER

## 2022-12-06 RX ORDER — CLONAZEPAM 1 MG/1
0.5 TABLET ORAL 2 TIMES DAILY PRN
Qty: 15 TABLET | Refills: 0 | Status: SHIPPED | OUTPATIENT
Start: 2022-12-06

## 2022-12-06 RX ORDER — HYDROXYZINE 50 MG/1
50 TABLET, FILM COATED ORAL 3 TIMES DAILY PRN
Qty: 90 TABLET | Refills: 1 | Status: CANCELLED | OUTPATIENT
Start: 2022-12-06

## 2022-12-06 RX ORDER — ATORVASTATIN CALCIUM 20 MG/1
20 TABLET, FILM COATED ORAL DAILY
Qty: 90 TABLET | Refills: 1 | Status: CANCELLED | OUTPATIENT
Start: 2022-12-06

## 2022-12-06 RX ORDER — SITAGLIPTIN AND METFORMIN HYDROCHLORIDE 1000; 50 MG/1; MG/1
1 TABLET, FILM COATED ORAL 2 TIMES DAILY WITH MEALS
Qty: 180 TABLET | Refills: 1 | Status: CANCELLED | OUTPATIENT
Start: 2022-12-06

## 2022-12-06 RX ORDER — PANTOPRAZOLE SODIUM 40 MG/1
40 TABLET, DELAYED RELEASE ORAL DAILY
Qty: 90 TABLET | Refills: 1 | Status: CANCELLED | OUTPATIENT
Start: 2022-12-06

## 2022-12-06 NOTE — PROGRESS NOTES
Chief Complaint  Anxiety and Med Refill    Subjective         Abdiaziz Krishna presents to [facility]  History of Present Illness  Objective    She is doing a TeleMed for refill of Klonopin.  She only takes it when she goes down to [place].  She does well with 15 tablets.  Her anxiety gets very extreme with driving and getting around.  She only takes half a tablet when she needs it.  She we will do her urine drug screen on Friday when her  has an appointment anytime.  She is doing good overall though.  Rest of her refills are not due until late February which she already has an appointment for.  Vital Signs:   There were no vitals taken for this visit.    Physical Exam   Constitutional: She appears well-developed and well-nourished.   HENT:   Head: Normocephalic.   Pulmonary/Chest: Effort normal.  No respiratory distress.  Neurological: She is alert. No cranial nerve deficit.   Skin: No bruising and no rash noted.   Psychiatric: She has a normal mood and affect. Her speech is normal and behavior is normal. Thought content normal.     Result Review :     Common labs    Common Labs 5/25/22 5/25/22 5/25/22 5/25/22 8/23/22 8/23/22 8/23/22 8/23/22 8/23/22    0828 0828 0828 0828 0942 0942 0942 0942 1006   Glucose   282 (A)    145 (A)     BUN   14    18     Creatinine   1.02 (A)    1.04 (A)     Sodium   139    141     Potassium   4.6    4.9     Chloride   101    101     Calcium   9.7    9.8     Albumin   4.00    4.50     Total Bilirubin   0.3    0.4     Alkaline Phosphatase   98    81     AST (SGOT)   75 (A)    34 (A)     ALT (SGPT)   107 (A)    49 (A)     WBC 4.95    7.28       Hemoglobin 14.4    14.7       Hematocrit 42.5    43.7       Platelets 253    301       Total Cholesterol    200    97    Triglycerides    288 (A)    139    HDL Cholesterol    33 (A)    32 (A)    LDL Cholesterol     117 (A)    41    Hemoglobin A1C  12.40 (A)    8.00 (A)      Microalbumin, Urine         <1.2    (A) Abnormal value            Most Recent A1C    HGBA1C Most Recent 8/23/22   Hemoglobin A1C 8.00 (A)   (A) Abnormal value                      Assessment and Plan    Diagnoses and all orders for this visit:    1. Medication monitoring encounter (Primary)  -     Urine Drug Screen - Urine, Clean Catch    2. LEELEE (generalized anxiety disorder)  -     clonazePAM (KlonoPIN) 1 MG tablet; Take 0.5 tablets by mouth 2 (Two) Times a Day As Needed for Anxiety.  Dispense: 15 tablet; Refill: 0  -     Urine Drug Screen - Urine, Clean Catch        Follow Up   Return if symptoms worsen or fail to improve.   Consent on chart, CARINE and UDS appropriate for drug prescribed.  She we will get her urine drug screen on Friday.  She is aware of the risk versus benefits of the medication.  She does not overuse the medication.  Patient was given instructions and counseling regarding her condition or for health maintenance advice. Please see specific information pulled into the AVS if appropriate.     Mode of Visit: Video  Location of patient: home  [place] of provider[place] [facility] [place] [facility]  You have chosen to receive care through a telehealth visit.  Does the patient consent to use a video/audio connection for your medical care today? Yes  The visit included audio and video interaction. No technical issues occurred during this visit.   Answers for HPI/ROS submitted by the patient on 11/29/2022  Please describe your symptoms.: extreme anxiety  Have you had these symptoms before?: Yes  How long have you been having these symptoms?: Greater than 2 weeks  Please list any medications you are currently taking for this condition.: clonazepam  Please describe any probable cause for these symptoms. : will be traveling and get extremely anxious   What is the primary reason for your visit?: Other  Anahi Flores, GEMINI  12/06/2022

## 2022-12-09 ENCOUNTER — LAB (OUTPATIENT)
Dept: LAB | Facility: HOSPITAL | Age: 67
End: 2022-12-09

## 2022-12-09 DIAGNOSIS — I10 ESSENTIAL HYPERTENSION: ICD-10-CM

## 2022-12-09 DIAGNOSIS — E78.2 MIXED HYPERLIPIDEMIA: ICD-10-CM

## 2022-12-09 LAB
AMPHET+METHAMPHET UR QL: NEGATIVE
BARBITURATES UR QL SCN: NEGATIVE
BASOPHILS # BLD AUTO: 0.07 10*3/MM3 (ref 0–0.2)
BASOPHILS NFR BLD AUTO: 1 % (ref 0–1.5)
BENZODIAZ UR QL SCN: NEGATIVE
CANNABINOIDS SERPL QL: NEGATIVE
COCAINE UR QL: NEGATIVE
DEPRECATED RDW RBC AUTO: 40.5 FL (ref 37–54)
EOSINOPHIL # BLD AUTO: 0.23 10*3/MM3 (ref 0–0.4)
EOSINOPHIL NFR BLD AUTO: 3.3 % (ref 0.3–6.2)
ERYTHROCYTE [DISTWIDTH] IN BLOOD BY AUTOMATED COUNT: 12.2 % (ref 12.3–15.4)
HCT VFR BLD AUTO: 42.6 % (ref 34–46.6)
HGB BLD-MCNC: 13.7 G/DL (ref 12–15.9)
IMM GRANULOCYTES # BLD AUTO: 0.02 10*3/MM3 (ref 0–0.05)
IMM GRANULOCYTES NFR BLD AUTO: 0.3 % (ref 0–0.5)
LYMPHOCYTES # BLD AUTO: 1.98 10*3/MM3 (ref 0.7–3.1)
LYMPHOCYTES NFR BLD AUTO: 28.4 % (ref 19.6–45.3)
MCH RBC QN AUTO: 29.1 PG (ref 26.6–33)
MCHC RBC AUTO-ENTMCNC: 32.2 G/DL (ref 31.5–35.7)
MCV RBC AUTO: 90.6 FL (ref 79–97)
METHADONE UR QL SCN: NEGATIVE
MONOCYTES # BLD AUTO: 0.51 10*3/MM3 (ref 0.1–0.9)
MONOCYTES NFR BLD AUTO: 7.3 % (ref 5–12)
NEUTROPHILS NFR BLD AUTO: 4.15 10*3/MM3 (ref 1.7–7)
NEUTROPHILS NFR BLD AUTO: 59.7 % (ref 42.7–76)
NRBC BLD AUTO-RTO: 0 /100 WBC (ref 0–0.2)
OPIATES UR QL: NEGATIVE
OXYCODONE UR QL SCN: NEGATIVE
PLATELET # BLD AUTO: 298 10*3/MM3 (ref 140–450)
PMV BLD AUTO: 10.9 FL (ref 6–12)
RBC # BLD AUTO: 4.7 10*6/MM3 (ref 3.77–5.28)
WBC NRBC COR # BLD: 6.96 10*3/MM3 (ref 3.4–10.8)

## 2022-12-09 PROCEDURE — 80307 DRUG TEST PRSMV CHEM ANLYZR: CPT | Performed by: NURSE PRACTITIONER

## 2022-12-09 PROCEDURE — 85025 COMPLETE CBC W/AUTO DIFF WBC: CPT

## 2022-12-09 PROCEDURE — 36415 COLL VENOUS BLD VENIPUNCTURE: CPT

## 2023-02-24 ENCOUNTER — LAB (OUTPATIENT)
Dept: LAB | Facility: HOSPITAL | Age: 68
End: 2023-02-24
Payer: COMMERCIAL

## 2023-02-24 DIAGNOSIS — E11.65 TYPE 2 DIABETES MELLITUS WITH HYPERGLYCEMIA, WITHOUT LONG-TERM CURRENT USE OF INSULIN: ICD-10-CM

## 2023-02-24 DIAGNOSIS — E78.2 MIXED HYPERLIPIDEMIA: ICD-10-CM

## 2023-02-24 DIAGNOSIS — I10 ESSENTIAL HYPERTENSION: ICD-10-CM

## 2023-02-24 LAB
ALBUMIN SERPL-MCNC: 4.3 G/DL (ref 3.5–5.2)
ALBUMIN UR-MCNC: 2 MG/DL
ALBUMIN/GLOB SERPL: 1.4 G/DL
ALP SERPL-CCNC: 119 U/L (ref 39–117)
ALT SERPL W P-5'-P-CCNC: 116 U/L (ref 1–33)
ANION GAP SERPL CALCULATED.3IONS-SCNC: 8.5 MMOL/L (ref 5–15)
AST SERPL-CCNC: 82 U/L (ref 1–32)
BILIRUB SERPL-MCNC: 0.3 MG/DL (ref 0–1.2)
BUN SERPL-MCNC: 15 MG/DL (ref 8–23)
BUN/CREAT SERPL: 15.2 (ref 7–25)
CALCIUM SPEC-SCNC: 9.9 MG/DL (ref 8.6–10.5)
CHLORIDE SERPL-SCNC: 103 MMOL/L (ref 98–107)
CHOLEST SERPL-MCNC: 212 MG/DL (ref 0–200)
CO2 SERPL-SCNC: 25.5 MMOL/L (ref 22–29)
CREAT SERPL-MCNC: 0.99 MG/DL (ref 0.57–1)
EGFRCR SERPLBLD CKD-EPI 2021: 62.6 ML/MIN/1.73
GLOBULIN UR ELPH-MCNC: 3.1 GM/DL
GLUCOSE SERPL-MCNC: 302 MG/DL (ref 65–99)
HBA1C MFR BLD: 11.4 % (ref 4.8–5.6)
HDLC SERPL-MCNC: 38 MG/DL (ref 40–60)
LDLC SERPL CALC-MCNC: 129 MG/DL (ref 0–100)
LDLC/HDLC SERPL: 3.26 {RATIO}
POTASSIUM SERPL-SCNC: 5.3 MMOL/L (ref 3.5–5.2)
PROT SERPL-MCNC: 7.4 G/DL (ref 6–8.5)
SODIUM SERPL-SCNC: 137 MMOL/L (ref 136–145)
TRIGL SERPL-MCNC: 251 MG/DL (ref 0–150)
TSH SERPL DL<=0.05 MIU/L-ACNC: 1.74 UIU/ML (ref 0.27–4.2)
VLDLC SERPL-MCNC: 45 MG/DL (ref 5–40)

## 2023-02-24 PROCEDURE — 80053 COMPREHEN METABOLIC PANEL: CPT

## 2023-02-24 PROCEDURE — 84443 ASSAY THYROID STIM HORMONE: CPT

## 2023-02-24 PROCEDURE — 36415 COLL VENOUS BLD VENIPUNCTURE: CPT

## 2023-02-24 PROCEDURE — 82043 UR ALBUMIN QUANTITATIVE: CPT

## 2023-02-24 PROCEDURE — 83036 HEMOGLOBIN GLYCOSYLATED A1C: CPT

## 2023-02-24 PROCEDURE — 80061 LIPID PANEL: CPT

## 2023-03-06 ENCOUNTER — OFFICE VISIT (OUTPATIENT)
Dept: FAMILY MEDICINE CLINIC | Facility: CLINIC | Age: 68
End: 2023-03-06
Payer: COMMERCIAL

## 2023-03-06 VITALS
HEART RATE: 99 BPM | BODY MASS INDEX: 34.14 KG/M2 | SYSTOLIC BLOOD PRESSURE: 138 MMHG | RESPIRATION RATE: 18 BRPM | WEIGHT: 230.5 LBS | TEMPERATURE: 96.1 F | HEIGHT: 69 IN | DIASTOLIC BLOOD PRESSURE: 84 MMHG | OXYGEN SATURATION: 95 %

## 2023-03-06 DIAGNOSIS — E78.2 MIXED HYPERLIPIDEMIA: ICD-10-CM

## 2023-03-06 DIAGNOSIS — E11.65 TYPE 2 DIABETES MELLITUS WITH HYPERGLYCEMIA, WITHOUT LONG-TERM CURRENT USE OF INSULIN: ICD-10-CM

## 2023-03-06 DIAGNOSIS — F41.9 ANXIETY: ICD-10-CM

## 2023-03-06 DIAGNOSIS — K21.9 GASTROESOPHAGEAL REFLUX DISEASE WITHOUT ESOPHAGITIS: ICD-10-CM

## 2023-03-06 DIAGNOSIS — Z00.00 ANNUAL PHYSICAL EXAM: Primary | ICD-10-CM

## 2023-03-06 DIAGNOSIS — F41.1 GAD (GENERALIZED ANXIETY DISORDER): ICD-10-CM

## 2023-03-06 DIAGNOSIS — J45.20 MILD INTERMITTENT ASTHMA WITHOUT COMPLICATION: ICD-10-CM

## 2023-03-06 PROCEDURE — 99214 OFFICE O/P EST MOD 30 MIN: CPT | Performed by: NURSE PRACTITIONER

## 2023-03-06 PROCEDURE — 99397 PER PM REEVAL EST PAT 65+ YR: CPT | Performed by: NURSE PRACTITIONER

## 2023-03-06 RX ORDER — CLONAZEPAM 1 MG/1
0.5 TABLET ORAL 2 TIMES DAILY PRN
Qty: 15 TABLET | Refills: 0 | Status: CANCELLED | OUTPATIENT
Start: 2023-03-06

## 2023-03-06 RX ORDER — ALBUTEROL SULFATE 90 UG/1
2 AEROSOL, METERED RESPIRATORY (INHALATION) EVERY 4 HOURS PRN
Qty: 8 G | Refills: 2 | Status: SHIPPED | OUTPATIENT
Start: 2023-03-06

## 2023-03-06 RX ORDER — ATORVASTATIN CALCIUM 20 MG/1
20 TABLET, FILM COATED ORAL DAILY
Qty: 90 TABLET | Refills: 0 | Status: SHIPPED | OUTPATIENT
Start: 2023-03-06

## 2023-03-06 RX ORDER — HYDROXYZINE 50 MG/1
50 TABLET, FILM COATED ORAL 3 TIMES DAILY PRN
Qty: 270 TABLET | Refills: 0 | Status: SHIPPED | OUTPATIENT
Start: 2023-03-06

## 2023-03-06 RX ORDER — SITAGLIPTIN AND METFORMIN HYDROCHLORIDE 1000; 50 MG/1; MG/1
1 TABLET, FILM COATED ORAL 2 TIMES DAILY WITH MEALS
Qty: 180 TABLET | Refills: 0 | Status: SHIPPED | OUTPATIENT
Start: 2023-03-06

## 2023-03-06 RX ORDER — PANTOPRAZOLE SODIUM 40 MG/1
40 TABLET, DELAYED RELEASE ORAL DAILY
Qty: 90 TABLET | Refills: 0 | Status: SHIPPED | OUTPATIENT
Start: 2023-03-06

## 2023-03-06 NOTE — PROGRESS NOTES
The ABCs of the Annual Wellness Visit  Subsequent Medicare Wellness Visit    Subjective    Abdiaziz Krishna is a 68 y.o. female who presents for a Subsequent Medicare Wellness Visit.    The following portions of the patient's history were reviewed and   updated as appropriate: allergies, current medications, past family history, past medical history, past social history, past surgical history and problem list.    Compared to one year ago, the patient feels her physical   health is the same.    Compared to one year ago, the patient feels her mental   health is the same.    Recent Hospitalizations:  She was not admitted to the hospital during the last year.       Current Medical Providers:  Patient Care Team:  Anahi Flores APRN as PCP - General (Nurse Practitioner)    Outpatient Medications Prior to Visit   Medication Sig Dispense Refill   • clonazePAM (KlonoPIN) 1 MG tablet Take 0.5 tablets by mouth 2 (Two) Times a Day As Needed for Anxiety. 15 tablet 0   • albuterol sulfate  (90 Base) MCG/ACT inhaler Inhale 2 puffs Every 4 (Four) Hours As Needed for Wheezing or Shortness of Air. 8 g 2   • atorvastatin (LIPITOR) 20 MG tablet Take 1 tablet by mouth Daily. 90 tablet 1   • colchicine 0.6 MG tablet 2 tab now then take 1 tab 1 hour later then daily for 4 days 7 tablet 0   • empagliflozin (Jardiance) 25 MG tablet tablet Take 1 tablet by mouth Daily. 90 tablet 1   • hydrOXYzine (ATARAX) 50 MG tablet Take 1 tablet by mouth 3 (Three) Times a Day As Needed for Anxiety. 90 tablet 1   • pantoprazole (PROTONIX) 40 MG EC tablet Take 1 tablet by mouth Daily. 90 tablet 1   • sitaGLIPtin-metFORMIN (Janumet)  MG per tablet Take 1 tablet by mouth 2 (Two) Times a Day With Meals. 180 tablet 1     No facility-administered medications prior to visit.       No opioid medication identified on active medication list. I have reviewed chart for other potential  high risk medication/s and harmful drug interactions in the  "elderly.          Aspirin is not on active medication list.  Aspirin use is not indicated based on review of current medical condition/s. Risk of harm outweighs potential benefits.  .    Patient Active Problem List   Diagnosis   • Seasonal allergic rhinitis   • Asthma   • Depression   • Hyperlipidemia   • Hypertension   • Arthritis   • Diabetes mellitus, type II (HCC)   • Low back pain   • Radiculopathy   • Essential hypertension   • LEELEE (generalized anxiety disorder)     Advance Care Planning  Advance Directive is not on file.  ACP discussion was declined by the patient. Patient does not have an advance directive, information provided.     Objective    Vitals:    23 1059   BP: 138/84   Pulse: 99   Resp: 18   Temp: 96.1 °F (35.6 °C)   SpO2: 95%   Weight: 105 kg (230 lb 8 oz)   Height: 175.3 cm (69\")     Estimated body mass index is 34.04 kg/m² as calculated from the following:    Height as of this encounter: 175.3 cm (69\").    Weight as of this encounter: 105 kg (230 lb 8 oz).    BMI is >= 30 and <35. (Class 1 Obesity). The following options were offered after discussion;: nutrition counseling/recommendations      Does the patient have evidence of cognitive impairment? No    Lab Results   Component Value Date    TRIG 251 (H) 2023    HDL 38 (L) 2023     (H) 2023    VLDL 45 (H) 2023    HGBA1C 11.40 (H) 2023        HEALTH RISK ASSESSMENT    Smoking Status:  Social History     Tobacco Use   Smoking Status Former   • Packs/day: 2.00   • Years: 46.00   • Pack years: 92.00   • Types: Cigarettes   • Start date: 1970   • Quit date: 2016   • Years since quittin.1   Smokeless Tobacco Never   Tobacco Comments    SMOKED FOR 35-40 YEARS QUIT 4 YEARS AGO     Alcohol Consumption:  Social History     Substance and Sexual Activity   Alcohol Use Not Currently    Comment: WAS HEAVY DRINKER FOR YEARS NOW RARELY     Fall Risk Screen:    GUERO Fall Risk Assessment was completed, and " patient is at LOW risk for falls.Assessment completed on:3/6/2023    Depression Screening:  PHQ-2/PHQ-9 Depression Screening 3/6/2023   Little Interest or Pleasure in Doing Things 3-->nearly every day   Feeling Down, Depressed or Hopeless 1-->several days   Trouble Falling or Staying Asleep, or Sleeping Too Much 1-->several days   Feeling Tired or Having Little Energy 1-->several days   Poor Appetite or Overeating 3-->nearly every day   Feeling Bad about Yourself - or that You are a Failure or Have Let Yourself or Your Family Down 2-->more than half the days   Trouble Concentrating on Things, Such as Reading the Newspaper or Watching Television 2-->more than half the days   Moving or Speaking So Slowly that Other People Could Have Noticed? Or the Opposite - Being So Fidgety 1-->several days   Thoughts that You Would be Better Off Dead or of Hurting Yourself in Some Way 0-->not at all   PHQ-9: Brief Depression Severity Measure Score 14   If You Checked Off Any Problems, How Difficult Have These Problems Made It For You to Do Your Work, Take Care of Things at Home, or Get Along with Other People? very difficult       Health Habits and Functional and Cognitive Screening:  Functional & Cognitive Status 3/6/2023   Do you have difficulty preparing food and eating? No   Do you have difficulty bathing yourself, getting dressed or grooming yourself? No   Do you have difficulty using the toilet? No   Do you have difficulty moving around from place to place? No   Do you have trouble with steps or getting out of a bed or a chair? No   Current Diet Other   Dental Exam Unknown   Eye Exam Unknown   Exercise (times per week) 0 times per week   Current Exercises Include No Regular Exercise   Do you need help using the phone?  No   Are you deaf or do you have serious difficulty hearing?  No   Do you need help with transportation? No   Do you need help shopping? No   Do you need help preparing meals?  No   Do you need help with  housework?  No   Do you need help with laundry? No   Do you need help taking your medications? No   Do you need help managing money? No   Do you ever drive or ride in a car without wearing a seat belt? No   Have you felt unusual stress, anger or loneliness in the last month? No   Who do you live with? Spouse   If you need help, do you have trouble finding someone available to you? No   Have you been bothered in the last four weeks by sexual problems? -   Do you have difficulty concentrating, remembering or making decisions? No       Age-appropriate Screening Schedule:  Refer to the list below for future screening recommendations based on patient's age, sex and/or medical conditions. Orders for these recommended tests are listed in the plan section. The patient has been provided with a written plan.    Health Maintenance   Topic Date Due   • ANNUAL PHYSICAL  Never done   • DIABETIC FOOT EXAM  Never done   • Pneumococcal Vaccine 65+ (2 - PCV) 11/02/2022   • COVID-19 Vaccine (3 - Booster for Pfizer series) 03/08/2023 (Originally 7/9/2021)   • MAMMOGRAM  05/31/2023 (Originally 3/6/2021)   • LUNG CANCER SCREENING  05/31/2023 (Originally 3/1/2005)   • TDAP/TD VACCINES (1 - Tdap) 09/18/2023 (Originally 3/1/1974)   • DIABETIC EYE EXAM  07/01/2023   • HEMOGLOBIN A1C  08/24/2023   • LIPID PANEL  02/24/2024   • URINE MICROALBUMIN  02/24/2024   • DXA SCAN  10/31/2024   • COLORECTAL CANCER SCREENING  02/11/2029   • HEPATITIS C SCREENING  Completed   • INFLUENZA VACCINE  Completed   • ZOSTER VACCINE  Completed                CMS Preventative Services Quick Reference  Risk Factors Identified During Encounter  Fall Risk-High or Moderate: Discussed Fall Prevention in the home  Immunizations Discussed/Encouraged: Prevnar 20 (Pneumococcal 20-valent conjugate)  Dental Screening Recommended  Vision Screening Recommended  The above risks/problems have been discussed with the patient.  Pertinent information has been shared with the  "patient in the After Visit Summary.  An After Visit Summary and PPPS were made available to the patient.    Follow Up:   Next Medicare Wellness visit to be scheduled in 1 year.       Additional E&M Note during same encounter follows:  Patient has multiple medical problems which are significant and separately identifiable that require additional work above and beyond the Medicare Wellness Visit.      Chief Complaint  Diabetes, Hypertension, and Hyperlipidemia    Subjective        HPI  Abdiaziz Krishna is also being seen today for   She has been taking care of her  who is got stage IV metastatic cancer of the prostate.  He is taking his chemo.  In September they will be moving full-time down in [place].  She will come up and visit her kids for her \"vacation\".  Hypertension: She is currently not on blood pressure medicine.  Her blood pressure is well controlled with watching salt.  Diabetes: Her sugar is out of control.  She does admit to not taking her medications daily as she is put herself on the back burner as she is taking care of her .  She also cannot stand the keto-based diet anymore.  She said when she eats that she gets really queasy.  She will do better this next time as she does not want to see endocrinology yet.  LIPID: She is taking her atorvastatin most days.  We did review her lab work today and she is already reviewed it herself at home  GERD: She is taking her Protonix most days also.  ANXIETY: She does take the Atarax as needed for the anxiety.  Review of Systems   Constitutional: Positive for fatigue.   Respiratory: Negative for cough and shortness of breath.    Cardiovascular: Negative for chest pain and leg swelling.   Gastrointestinal: Negative for nausea and vomiting.   Psychiatric/Behavioral: Negative for hallucinations and suicidal ideas.       Objective   Vital Signs:  /84   Pulse 99   Temp 96.1 °F (35.6 °C)   Resp 18   Ht 175.3 cm (69\")   Wt 105 kg (230 lb 8 oz)   " SpO2 95%   BMI 34.04 kg/m²     Physical Exam  Vitals reviewed.   Constitutional:       Appearance: Normal appearance. She is well-developed.   Cardiovascular:      Rate and Rhythm: Normal rate and regular rhythm.      Heart sounds: Normal heart sounds. No murmur heard.  Pulmonary:      Effort: Pulmonary effort is normal.      Breath sounds: Normal breath sounds.   Neurological:      Mental Status: She is alert and oriented to person, place, and time.      Cranial Nerves: No cranial nerve deficit.      Motor: No weakness.   Psychiatric:         Mood and Affect: Mood and affect normal.            Common labs    Common Labs 8/23/22 8/23/22 8/23/22 8/23/22 8/23/22 12/9/22 2/24/23 2/24/23 2/24/23 2/24/23    0942 0942 0942 0942 1006  1109 1109 1109 1118   Glucose   145 (A)    302 (A)      BUN   18    15      Creatinine   1.04 (A)    0.99      Sodium   141    137      Potassium   4.9    5.3 (A)      Chloride   101    103      Calcium   9.8    9.9      Albumin   4.50    4.3      Total Bilirubin   0.4    0.3      Alkaline Phosphatase   81    119 (A)      AST (SGOT)   34 (A)    82 (A)      ALT (SGPT)   49 (A)    116 (A)      WBC 7.28     6.96       Hemoglobin 14.7     13.7       Hematocrit 43.7     42.6       Platelets 301     298       Total Cholesterol    97    212 (A)     Triglycerides    139    251 (A)     HDL Cholesterol    32 (A)    38 (A)     LDL Cholesterol     41    129 (A)     Hemoglobin A1C  8.00 (A)       11.40 (A)    Microalbumin, Urine     <1.2     2.0   (A) Abnormal value            Most Recent A1C    HGBA1C Most Recent 2/24/23   Hemoglobin A1C 11.40 (A)   (A) Abnormal value                       Assessment and Plan   Diagnoses and all orders for this visit:    1. Annual physical exam (Primary)    2. Type 2 diabetes mellitus with hyperglycemia, without long-term current use of insulin (HCC)  -     sitaGLIPtin-metFORMIN (Janumet)  MG per tablet; Take 1 tablet by mouth 2 (Two) Times a Day With Meals.   Dispense: 180 tablet; Refill: 0  -     empagliflozin (Jardiance) 25 MG tablet tablet; Take 1 tablet by mouth Daily.  Dispense: 90 tablet; Refill: 0  -     CBC & Differential; Future  -     Comprehensive Metabolic Panel; Future  -     TSH; Future  -     Lipid Panel; Future  -     Hemoglobin A1c; Future  -     MicroAlbumin, Urine, Random - Urine, Clean Catch; Future    3. Gastroesophageal reflux disease without esophagitis  -     pantoprazole (PROTONIX) 40 MG EC tablet; Take 1 tablet by mouth Daily.  Dispense: 90 tablet; Refill: 0    4. Anxiety  -     hydrOXYzine (ATARAX) 50 MG tablet; Take 1 tablet by mouth 3 (Three) Times a Day As Needed for Anxiety.  Dispense: 270 tablet; Refill: 0    5. LEELEE (generalized anxiety disorder)    6. Mixed hyperlipidemia  -     atorvastatin (LIPITOR) 20 MG tablet; Take 1 tablet by mouth Daily.  Dispense: 90 tablet; Refill: 0  -     CBC & Differential; Future  -     Comprehensive Metabolic Panel; Future  -     TSH; Future  -     Lipid Panel; Future    7. Mild intermittent asthma without complication  -     albuterol sulfate  (90 Base) MCG/ACT inhaler; Inhale 2 puffs Every 4 (Four) Hours As Needed for Wheezing or Shortness of Air.  Dispense: 8 g; Refill: 2             Follow Up   Return in about 3 months (around 6/6/2023).   Follow-up in 3 months for labs and appt. Call with any concerns or questions that you may have regarding your medications or history.    ADVICE WAS GIVEN RE:  ALCOHOL USE, SEATBELT USE, REGULAR EXERCISE, HEALTHY DIET, ROUTINE EYE AND DENTAL EXAMS  She declines a referral to endocrinology right now.  She will really work on diet and taking her medications routinely.  We will see how she is doing in 3 months.  Patient was given instructions and counseling regarding her condition or for health maintenance advice. Please see specific information pulled into the AVS if appropriate.     Anahi Flores, GEMINI  03/06/2023

## 2023-03-06 NOTE — PROGRESS NOTES
Answers for HPI/ROS submitted by the patient on 2/27/2023  Please describe your symptoms.: follow up  Have you had these symptoms before?: Yes  How long have you been having these symptoms?: Greater than 2 weeks  Please list any medications you are currently taking for this condition.: jardiance  Please describe any probable cause for these symptoms. : diabetes 2  What is the primary reason for your visit?: Other

## 2023-05-19 DIAGNOSIS — E78.2 MIXED HYPERLIPIDEMIA: ICD-10-CM

## 2023-05-19 DIAGNOSIS — J45.20 MILD INTERMITTENT ASTHMA WITHOUT COMPLICATION: ICD-10-CM

## 2023-05-19 DIAGNOSIS — E11.65 TYPE 2 DIABETES MELLITUS WITH HYPERGLYCEMIA, WITHOUT LONG-TERM CURRENT USE OF INSULIN: ICD-10-CM

## 2023-05-19 DIAGNOSIS — K21.9 GASTROESOPHAGEAL REFLUX DISEASE WITHOUT ESOPHAGITIS: ICD-10-CM

## 2023-05-19 DIAGNOSIS — F41.9 ANXIETY: ICD-10-CM

## 2023-05-19 RX ORDER — ATORVASTATIN CALCIUM 20 MG/1
20 TABLET, FILM COATED ORAL DAILY
Qty: 30 TABLET | Refills: 0 | Status: SHIPPED | OUTPATIENT
Start: 2023-05-19

## 2023-05-19 RX ORDER — SITAGLIPTIN AND METFORMIN HYDROCHLORIDE 1000; 50 MG/1; MG/1
1 TABLET, FILM COATED ORAL 2 TIMES DAILY WITH MEALS
Qty: 60 TABLET | Refills: 0 | Status: SHIPPED | OUTPATIENT
Start: 2023-05-19

## 2023-05-19 RX ORDER — HYDROXYZINE 50 MG/1
50 TABLET, FILM COATED ORAL 3 TIMES DAILY PRN
Qty: 90 TABLET | Refills: 0 | Status: SHIPPED | OUTPATIENT
Start: 2023-05-19

## 2023-05-19 RX ORDER — ALBUTEROL SULFATE 90 UG/1
2 AEROSOL, METERED RESPIRATORY (INHALATION) EVERY 4 HOURS PRN
Qty: 8 G | Refills: 0 | Status: SHIPPED | OUTPATIENT
Start: 2023-05-19

## 2023-05-19 RX ORDER — PANTOPRAZOLE SODIUM 40 MG/1
40 TABLET, DELAYED RELEASE ORAL DAILY
Qty: 30 TABLET | Refills: 0 | Status: SHIPPED | OUTPATIENT
Start: 2023-05-19

## 2023-06-01 ENCOUNTER — LAB (OUTPATIENT)
Dept: LAB | Facility: HOSPITAL | Age: 68
End: 2023-06-01
Payer: COMMERCIAL

## 2023-06-01 DIAGNOSIS — E78.2 MIXED HYPERLIPIDEMIA: ICD-10-CM

## 2023-06-01 DIAGNOSIS — E11.65 TYPE 2 DIABETES MELLITUS WITH HYPERGLYCEMIA, WITHOUT LONG-TERM CURRENT USE OF INSULIN: ICD-10-CM

## 2023-06-01 LAB
ALBUMIN SERPL-MCNC: 4.3 G/DL (ref 3.5–5.2)
ALBUMIN UR-MCNC: 3.6 MG/DL
ALBUMIN/GLOB SERPL: 1.6 G/DL
ALP SERPL-CCNC: 108 U/L (ref 39–117)
ALT SERPL W P-5'-P-CCNC: 111 U/L (ref 1–33)
ANION GAP SERPL CALCULATED.3IONS-SCNC: 13.6 MMOL/L (ref 5–15)
AST SERPL-CCNC: 75 U/L (ref 1–32)
BASOPHILS # BLD AUTO: 0.05 10*3/MM3 (ref 0–0.2)
BASOPHILS NFR BLD AUTO: 0.8 % (ref 0–1.5)
BILIRUB SERPL-MCNC: 0.4 MG/DL (ref 0–1.2)
BUN SERPL-MCNC: 15 MG/DL (ref 8–23)
BUN/CREAT SERPL: 16.5 (ref 7–25)
CALCIUM SPEC-SCNC: 9.9 MG/DL (ref 8.6–10.5)
CHLORIDE SERPL-SCNC: 104 MMOL/L (ref 98–107)
CHOLEST SERPL-MCNC: 121 MG/DL (ref 0–200)
CO2 SERPL-SCNC: 23.4 MMOL/L (ref 22–29)
CREAT SERPL-MCNC: 0.91 MG/DL (ref 0.57–1)
DEPRECATED RDW RBC AUTO: 41.6 FL (ref 37–54)
EGFRCR SERPLBLD CKD-EPI 2021: 68.9 ML/MIN/1.73
EOSINOPHIL # BLD AUTO: 0.27 10*3/MM3 (ref 0–0.4)
EOSINOPHIL NFR BLD AUTO: 4.5 % (ref 0.3–6.2)
ERYTHROCYTE [DISTWIDTH] IN BLOOD BY AUTOMATED COUNT: 12.7 % (ref 12.3–15.4)
GLOBULIN UR ELPH-MCNC: 2.7 GM/DL
GLUCOSE SERPL-MCNC: 269 MG/DL (ref 65–99)
HBA1C MFR BLD: 11.2 % (ref 4.8–5.6)
HCT VFR BLD AUTO: 45.3 % (ref 34–46.6)
HDLC SERPL-MCNC: 33 MG/DL (ref 40–60)
HGB BLD-MCNC: 15.2 G/DL (ref 12–15.9)
IMM GRANULOCYTES # BLD AUTO: 0.02 10*3/MM3 (ref 0–0.05)
IMM GRANULOCYTES NFR BLD AUTO: 0.3 % (ref 0–0.5)
LDLC SERPL CALC-MCNC: 55 MG/DL (ref 0–100)
LDLC/HDLC SERPL: 1.43 {RATIO}
LYMPHOCYTES # BLD AUTO: 1.83 10*3/MM3 (ref 0.7–3.1)
LYMPHOCYTES NFR BLD AUTO: 30.4 % (ref 19.6–45.3)
MCH RBC QN AUTO: 30.6 PG (ref 26.6–33)
MCHC RBC AUTO-ENTMCNC: 33.6 G/DL (ref 31.5–35.7)
MCV RBC AUTO: 91.1 FL (ref 79–97)
MONOCYTES # BLD AUTO: 0.47 10*3/MM3 (ref 0.1–0.9)
MONOCYTES NFR BLD AUTO: 7.8 % (ref 5–12)
NEUTROPHILS NFR BLD AUTO: 3.38 10*3/MM3 (ref 1.7–7)
NEUTROPHILS NFR BLD AUTO: 56.2 % (ref 42.7–76)
NRBC BLD AUTO-RTO: 0 /100 WBC (ref 0–0.2)
PLATELET # BLD AUTO: 253 10*3/MM3 (ref 140–450)
PMV BLD AUTO: 11 FL (ref 6–12)
POTASSIUM SERPL-SCNC: 4.6 MMOL/L (ref 3.5–5.2)
PROT SERPL-MCNC: 7 G/DL (ref 6–8.5)
RBC # BLD AUTO: 4.97 10*6/MM3 (ref 3.77–5.28)
SODIUM SERPL-SCNC: 141 MMOL/L (ref 136–145)
TRIGL SERPL-MCNC: 204 MG/DL (ref 0–150)
TSH SERPL DL<=0.05 MIU/L-ACNC: 1.3 UIU/ML (ref 0.27–4.2)
VLDLC SERPL-MCNC: 33 MG/DL (ref 5–40)
WBC NRBC COR # BLD: 6.02 10*3/MM3 (ref 3.4–10.8)

## 2023-06-01 PROCEDURE — 84443 ASSAY THYROID STIM HORMONE: CPT

## 2023-06-01 PROCEDURE — 80053 COMPREHEN METABOLIC PANEL: CPT

## 2023-06-01 PROCEDURE — 80061 LIPID PANEL: CPT

## 2023-06-01 PROCEDURE — 36415 COLL VENOUS BLD VENIPUNCTURE: CPT

## 2023-06-01 PROCEDURE — 85025 COMPLETE CBC W/AUTO DIFF WBC: CPT

## 2023-06-01 PROCEDURE — 83036 HEMOGLOBIN GLYCOSYLATED A1C: CPT

## 2023-06-01 PROCEDURE — 82043 UR ALBUMIN QUANTITATIVE: CPT

## 2023-06-12 ENCOUNTER — OFFICE VISIT (OUTPATIENT)
Dept: FAMILY MEDICINE CLINIC | Facility: CLINIC | Age: 68
End: 2023-06-12
Payer: COMMERCIAL

## 2023-06-12 VITALS
TEMPERATURE: 97.4 F | BODY MASS INDEX: 33.04 KG/M2 | RESPIRATION RATE: 20 BRPM | DIASTOLIC BLOOD PRESSURE: 82 MMHG | SYSTOLIC BLOOD PRESSURE: 130 MMHG | HEART RATE: 94 BPM | OXYGEN SATURATION: 96 % | HEIGHT: 69 IN | WEIGHT: 223.1 LBS

## 2023-06-12 DIAGNOSIS — L50.9 HIVES: ICD-10-CM

## 2023-06-12 DIAGNOSIS — F41.9 ANXIETY: ICD-10-CM

## 2023-06-12 DIAGNOSIS — E78.2 MIXED HYPERLIPIDEMIA: ICD-10-CM

## 2023-06-12 DIAGNOSIS — K21.9 GASTROESOPHAGEAL REFLUX DISEASE WITHOUT ESOPHAGITIS: ICD-10-CM

## 2023-06-12 DIAGNOSIS — Z51.81 ENCOUNTER FOR MEDICATION MONITORING: ICD-10-CM

## 2023-06-12 DIAGNOSIS — F41.1 GAD (GENERALIZED ANXIETY DISORDER): ICD-10-CM

## 2023-06-12 DIAGNOSIS — E11.65 TYPE 2 DIABETES MELLITUS WITH HYPERGLYCEMIA, WITHOUT LONG-TERM CURRENT USE OF INSULIN: Primary | ICD-10-CM

## 2023-06-12 LAB
AMPHETAMINES UR QL: NEGATIVE
BENZODIAZ UR QL SCN: NEGATIVE
BENZODIAZEPINE INTERNAL CONTROL: NORMAL
BUPRENORPHINE INTERNAL CONTROL: NORMAL
BUPRENORPHINE SERPL-MCNC: NEGATIVE NG/ML
CANNABINOIDS SERPL QL: NEGATIVE
COCAINE INTERNAL CONTROL: NORMAL
COCAINE UR QL: NEGATIVE
EXPIRATION DATE: NORMAL
Lab: NORMAL
MDMA (ECSTASY) INTERNAL CONTROL: NORMAL
MDMA UR QL SCN: NEGATIVE
METHADONE INTERNAL CONTROL: NORMAL
METHADONE UR QL SCN: NEGATIVE
METHAMPHETAMINE INTERNAL CONTROL: NORMAL
OPIATES INTERNAL CONTROL: NORMAL
OPIATES UR QL: NEGATIVE
OXYCODONE INTERNAL CONTROL: NORMAL
OXYCODONE UR QL SCN: NEGATIVE
PCP UR QL SCN: NEGATIVE
PHENCYCLIDINE INTERNAL CONTROL: NORMAL
THC INTERNAL CONTROL: NORMAL

## 2023-06-12 RX ORDER — ATORVASTATIN CALCIUM 20 MG/1
20 TABLET, FILM COATED ORAL DAILY
Qty: 30 TABLET | Refills: 2 | Status: SHIPPED | OUTPATIENT
Start: 2023-06-12

## 2023-06-12 RX ORDER — CLONAZEPAM 1 MG/1
0.5 TABLET ORAL 2 TIMES DAILY PRN
Qty: 15 TABLET | Refills: 0 | Status: SHIPPED | OUTPATIENT
Start: 2023-06-12

## 2023-06-12 RX ORDER — ESCITALOPRAM OXALATE 10 MG/1
10 TABLET ORAL DAILY
Qty: 30 TABLET | Refills: 2 | Status: SHIPPED | OUTPATIENT
Start: 2023-06-12

## 2023-06-12 RX ORDER — SITAGLIPTIN AND METFORMIN HYDROCHLORIDE 1000; 50 MG/1; MG/1
1 TABLET, FILM COATED ORAL 2 TIMES DAILY WITH MEALS
Qty: 60 TABLET | Refills: 2 | Status: SHIPPED | OUTPATIENT
Start: 2023-06-12

## 2023-06-12 RX ORDER — PANTOPRAZOLE SODIUM 40 MG/1
40 TABLET, DELAYED RELEASE ORAL DAILY
Qty: 30 TABLET | Refills: 0 | Status: SHIPPED | OUTPATIENT
Start: 2023-06-12

## 2023-06-12 RX ORDER — HYDROXYZINE 50 MG/1
50 TABLET, FILM COATED ORAL 3 TIMES DAILY PRN
Qty: 90 TABLET | Refills: 2 | Status: SHIPPED | OUTPATIENT
Start: 2023-06-12

## 2023-06-12 RX ORDER — PEN NEEDLE, DIABETIC 32GX 5/32"
1 NEEDLE, DISPOSABLE MISCELLANEOUS WEEKLY
Qty: 100 EACH | Refills: 0 | Status: SHIPPED | OUTPATIENT
Start: 2023-06-12

## 2023-06-12 NOTE — PROGRESS NOTES
"Chief Complaint  Hypertension, Diabetes, and Anxiety (States she had hives on her face this weekend )    Subjective          Abdiaziz Krishna presents to [facility]  History of Present Illness  She has been taking care of her  who is got stage IV metastatic cancer of the prostate.  He is taking his chemo.  In Nov they will be moving full-time down in [place].  She will come up and visit her kids for her \"vacation\".  Hypertension: She is currently not on blood pressure medicine.  Her blood pressure is well controlled with watching salt.  Diabetes: Her sugar is out of control.  She is taking her medicine.  She is willing to do injection.  She will do better this next time as she does not want to see endocrinology yet.  Does not want insulin.  LIPID: She is taking her atorvastatin most days.    GERD: She is taking her Protonix most days also.  ANXIETY: She does take the Atarax as needed for the anxiety.  She has been taking her Klonopin more frequently.  She is having hives over the anxiety and is willing to take something daily.  She does not know if she can drive all the way down to [place] with the anxiety.      Depression: At risk    PHQ-2 Score: 14           Allergies  Patient has no known allergies.    Social History     Tobacco Use    Smoking status: Former     Packs/day: 2.00     Years: 46.00     Pack years: 92.00     Types: Cigarettes     Start date: 1970     Quit date: 2016     Years since quittin.4    Smokeless tobacco: Never    Tobacco comments:     SMOKED FOR 35-40 YEARS QUIT 4 YEARS AGO   Vaping Use    Vaping Use: Never used   Substance Use Topics    Alcohol use: Not Currently     Comment: WAS HEAVY DRINKER FOR YEARS NOW RARELY    Drug use: Never       Family History   Problem Relation Age of Onset    Emphysema Mother     Heart failure Mother     Arthritis Mother     Heart disease Father     Diabetes Father     Aneurysm Father         ABDOMINAL DECD AGE " "88    Diabetes Daughter         gestational        Health Maintenance Due   Topic Date Due    DIABETIC FOOT EXAM  Never done    COVID-19 Vaccine (3 - Pfizer series) 07/09/2021    Pneumococcal Vaccine 65+ (2 - PCV) 11/02/2022        Immunization History   Administered Date(s) Administered    COVID-19 (PFIZER) Purple Cap Monovalent 04/23/2021, 05/14/2021    Fluad Quad 65+ 10/12/2021    Fluzone High-Dose 65+yrs 09/15/2022    Hepatitis A 04/01/2018, 10/01/2018    Influenza, Unspecified 10/20/2020    Pneumococcal Polysaccharide (PPSV23) 11/02/2021    Shingrix 06/01/2022, 08/26/2022       Review of Systems   Constitutional:  Positive for fatigue.   Respiratory:  Negative for cough and shortness of breath.    Cardiovascular:  Negative for chest pain.   Gastrointestinal:  Negative for diarrhea, nausea and vomiting.   Psychiatric/Behavioral:  Positive for stress. The patient is nervous/anxious.       Objective       Vitals:    06/12/23 1101   BP: 130/82   Pulse: 94   Resp: 20   Temp: 97.4 °F (36.3 °C)   SpO2: 96%   Weight: 101 kg (223 lb 1.6 oz)   Height: 175.3 cm (69\")       Body mass index is 32.95 kg/m².         Physical Exam  Vitals reviewed.   Constitutional:       Appearance: Normal appearance. She is well-developed.   Cardiovascular:      Rate and Rhythm: Normal rate and regular rhythm.      Heart sounds: Normal heart sounds. No murmur heard.  Pulmonary:      Effort: Pulmonary effort is normal.      Breath sounds: Normal breath sounds.   Neurological:      Mental Status: She is alert and oriented to person, place, and time.      Cranial Nerves: No cranial nerve deficit.      Motor: No weakness.   Psychiatric:         Mood and Affect: Mood and affect normal.           Result Review :     The following data was reviewed by: GEMINI James on 06/12/2023:    Common Labs   Common labs          12/9/2022    09:41 2/24/2023    11:09 2/24/2023    11:18 6/1/2023    09:13 6/1/2023    09:22   Common Labs   Glucose  " 302   269     BUN  15   15     Creatinine  0.99   0.91     Sodium  137   141     Potassium  5.3   4.6     Chloride  103   104     Calcium  9.9   9.9     Albumin  4.3   4.3     Total Bilirubin  0.3   0.4     Alkaline Phosphatase  119   108     AST (SGOT)  82   75     ALT (SGPT)  116   111     WBC 6.96    6.02     Hemoglobin 13.7    15.2     Hematocrit 42.6    45.3     Platelets 298    253     Total Cholesterol  212   121     Triglycerides  251   204     HDL Cholesterol  38   33     LDL Cholesterol   129   55     Hemoglobin A1C  11.40   11.20     Microalbumin, Urine   2.0   3.6      A1C   Most Recent A1C          6/1/2023    09:13   HGBA1C Most Recent   Hemoglobin A1C 11.20                     Assessment and Plan      Diagnoses and all orders for this visit:    1. Type 2 diabetes mellitus with hyperglycemia, without long-term current use of insulin (Primary)  -     empagliflozin (Jardiance) 25 MG tablet tablet; Take 1 tablet by mouth Daily.  Dispense: 30 tablet; Refill: 2  -     sitaGLIPtin-metFORMIN (Janumet)  MG per tablet; Take 1 tablet by mouth 2 (Two) Times a Day With Meals.  Dispense: 60 tablet; Refill: 2  -     Semaglutide,0.25 or 0.5MG/DOS, (OZEMPIC) 2 MG/3ML solution pen-injector; 0.25mg SQ weekly for 2 weeks then increase to 0.50mg weekly inj under the skin weekly  Dispense: 3 mL; Refill: 2  -     CBC & Differential; Future  -     Comprehensive Metabolic Panel; Future  -     TSH; Future  -     Lipid Panel; Future  -     Hemoglobin A1c; Future  -     MicroAlbumin, Urine, Random - Urine, Clean Catch; Future    2. Mixed hyperlipidemia  -     atorvastatin (LIPITOR) 20 MG tablet; Take 1 tablet by mouth Daily.  Dispense: 30 tablet; Refill: 2    3. LEELEE (generalized anxiety disorder)  -     clonazePAM (KlonoPIN) 1 MG tablet; Take 0.5 tablets by mouth 2 (Two) Times a Day As Needed for Anxiety.  Dispense: 15 tablet; Refill: 0  -     escitalopram (Lexapro) 10 MG tablet; Take 1 tablet by mouth Daily.  Dispense:  30 tablet; Refill: 2  -     POC Urine Drug Screen [facility]    4. Gastroesophageal reflux disease without esophagitis  -     pantoprazole (PROTONIX) 40 MG EC tablet; Take 1 tablet by mouth Daily.  Dispense: 30 tablet; Refill: 0    5. Anxiety  -     hydrOXYzine (ATARAX) 50 MG tablet; Take 1 tablet by mouth 3 (Three) Times a Day As Needed for Anxiety.  Dispense: 90 tablet; Refill: 2    6. Hives    7. Encounter for medication monitoring  -     POC Urine Drug Screen [facility]    Other orders  -     Insulin Pen Needle (BD Pen Needle Keena U/F) 32G X 4 MM misc; 1 each 1 (One) Time Per Week.  Dispense: 100 each; Refill: 0            Follow Up     Return in about 3 months (around 9/12/2023).  Follow-up in 3 months for labs and appt. Call with any concerns or questions that you may have regarding your medications or history.    We will adjust med and add Lexapro.  Consent on chart, CARINE and UDS appropriate for drug prescribed.   Patient was given instructions and counseling regarding her condition or for health maintenance advice. Please see specific information pulled into the AVS if appropriate.     Parts of this note are electronic transcriptions/translations of spoken language to printed text using the Dragon Dictation system.          GEMINI James  06/12/2023Answers submitted by the patient for this visit:  Other (Submitted on 6/5/2023)  Please describe your symptoms.: Follow up  Have you had these symptoms before?: Yes  How long have you been having these symptoms?: Greater than 2 weeks  Primary Reason for Visit (Submitted on 6/5/2023)  What is the primary reason for your visit?: Other

## 2023-08-10 DIAGNOSIS — K21.9 GASTROESOPHAGEAL REFLUX DISEASE WITHOUT ESOPHAGITIS: ICD-10-CM

## 2023-08-10 RX ORDER — PANTOPRAZOLE SODIUM 40 MG/1
40 TABLET, DELAYED RELEASE ORAL DAILY
Qty: 30 TABLET | Refills: 0 | Status: SHIPPED | OUTPATIENT
Start: 2023-08-10

## 2023-08-25 ENCOUNTER — OFFICE VISIT (OUTPATIENT)
Dept: PSYCHIATRY | Facility: CLINIC | Age: 68
End: 2023-08-25
Payer: COMMERCIAL

## 2023-08-25 VITALS
DIASTOLIC BLOOD PRESSURE: 79 MMHG | SYSTOLIC BLOOD PRESSURE: 127 MMHG | HEART RATE: 86 BPM | HEIGHT: 69 IN | BODY MASS INDEX: 31.67 KG/M2 | WEIGHT: 213.8 LBS

## 2023-08-25 DIAGNOSIS — F33.1 MODERATE EPISODE OF RECURRENT MAJOR DEPRESSIVE DISORDER: Primary | ICD-10-CM

## 2023-08-25 DIAGNOSIS — F41.1 GAD (GENERALIZED ANXIETY DISORDER): ICD-10-CM

## 2023-08-25 RX ORDER — SITAGLIPTIN AND METFORMIN HYDROCHLORIDE 1000; 50 MG/1; MG/1
1 TABLET, FILM COATED ORAL 2 TIMES DAILY WITH MEALS
COMMUNITY
Start: 2023-08-21

## 2023-08-25 RX ORDER — DIAZEPAM 5 MG/1
5 TABLET ORAL DAILY
Qty: 15 TABLET | Refills: 1 | Status: SHIPPED | OUTPATIENT
Start: 2023-08-25

## 2023-08-25 RX ORDER — BUSPIRONE HYDROCHLORIDE 5 MG/1
5 TABLET ORAL 2 TIMES DAILY
Qty: 60 TABLET | Refills: 1 | Status: SHIPPED | OUTPATIENT
Start: 2023-08-25

## 2023-08-25 RX ORDER — CLONIDINE HYDROCHLORIDE 0.1 MG/1
0.1 TABLET ORAL 2 TIMES DAILY PRN
Qty: 180 TABLET | Refills: 0 | Status: SHIPPED | OUTPATIENT
Start: 2023-08-25

## 2023-08-25 NOTE — TREATMENT PLAN
Multi-Disciplinary Problems (from Behavioral Health Treatment Plan)      Active Problems       Problem: Anxiety  Start Date: 08/25/23      Problem Details: The patient self-scales this problem as a 9 with 10 being the worst.          Goal Priority Start Date Expected End Date End Date    Patient will develop and implement behavioral and cognitive strategies to reduce anxiety and irrational fears. -- 08/25/23 -- --    Goal Details: Progress toward goal:  Not appropriate to rate progress toward goal since this is the initial treatment plan.          Goal Intervention Frequency Start Date End Date    Help patient explore past emotional issues in relation to present anxiety. PRN 08/25/23 --    Intervention Details: Duration of treatment until until remission of symptoms.          Goal Intervention Frequency Start Date End Date    Help patient develop an awareness of their cognitive and physical responses to anxiety. PRN 08/25/23 --    Intervention Details: Duration of treatment until until remission of symptoms.                  Problem: Depression  Start Date: 08/25/23      Problem Details: The patient self-scales this problem as a 6 with 10 being the worst.          Goal Priority Start Date Expected End Date End Date    Patient will demonstrate the ability to initiate new constructive life skills outside of sessions on a consistent basis. -- 08/25/23 -- --    Goal Details: Progress toward goal:  Not appropriate to rate progress toward goal since this is the initial treatment plan.          Goal Intervention Frequency Start Date End Date    Assist patient in setting attainable activities of daily living goals. PRN 08/25/23 --      Goal Intervention Frequency Start Date End Date    Provide education about depression PRN 08/25/23 --    Intervention Details: Duration of treatment until until remission of symptoms.          Goal Intervention Frequency Start Date End Date    Assist patient in developing healthy coping  strategies. PRN 08/25/23 --    Intervention Details: Duration of treatment until until remission of symptoms.                                 I have discussed and reviewed this treatment plan with the patient.
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
## 2023-09-19 ENCOUNTER — LAB (OUTPATIENT)
Dept: LAB | Facility: HOSPITAL | Age: 68
End: 2023-09-19
Payer: COMMERCIAL

## 2023-09-19 DIAGNOSIS — E11.65 TYPE 2 DIABETES MELLITUS WITH HYPERGLYCEMIA, WITHOUT LONG-TERM CURRENT USE OF INSULIN: ICD-10-CM

## 2023-09-19 LAB
ALBUMIN SERPL-MCNC: 4.5 G/DL (ref 3.5–5.2)
ALBUMIN UR-MCNC: <1.2 MG/DL
ALBUMIN/GLOB SERPL: 1.7 G/DL
ALP SERPL-CCNC: 78 U/L (ref 39–117)
ALT SERPL W P-5'-P-CCNC: 90 U/L (ref 1–33)
ANION GAP SERPL CALCULATED.3IONS-SCNC: 12 MMOL/L (ref 5–15)
AST SERPL-CCNC: 73 U/L (ref 1–32)
BASOPHILS # BLD AUTO: 0.05 10*3/MM3 (ref 0–0.2)
BASOPHILS NFR BLD AUTO: 0.7 % (ref 0–1.5)
BILIRUB SERPL-MCNC: 0.3 MG/DL (ref 0–1.2)
BUN SERPL-MCNC: 18 MG/DL (ref 8–23)
BUN/CREAT SERPL: 18.8 (ref 7–25)
CALCIUM SPEC-SCNC: 9.9 MG/DL (ref 8.6–10.5)
CHLORIDE SERPL-SCNC: 104 MMOL/L (ref 98–107)
CHOLEST SERPL-MCNC: 149 MG/DL (ref 0–200)
CO2 SERPL-SCNC: 26 MMOL/L (ref 22–29)
CREAT SERPL-MCNC: 0.96 MG/DL (ref 0.57–1)
DEPRECATED RDW RBC AUTO: 41.3 FL (ref 37–54)
EGFRCR SERPLBLD CKD-EPI 2021: 64.6 ML/MIN/1.73
EOSINOPHIL # BLD AUTO: 0.23 10*3/MM3 (ref 0–0.4)
EOSINOPHIL NFR BLD AUTO: 3.4 % (ref 0.3–6.2)
ERYTHROCYTE [DISTWIDTH] IN BLOOD BY AUTOMATED COUNT: 12.5 % (ref 12.3–15.4)
GLOBULIN UR ELPH-MCNC: 2.6 GM/DL
GLUCOSE SERPL-MCNC: 161 MG/DL (ref 65–99)
HBA1C MFR BLD: 7.9 % (ref 4.8–5.6)
HCT VFR BLD AUTO: 41.2 % (ref 34–46.6)
HDLC SERPL-MCNC: 42 MG/DL (ref 40–60)
HGB BLD-MCNC: 13.9 G/DL (ref 12–15.9)
IMM GRANULOCYTES # BLD AUTO: 0.02 10*3/MM3 (ref 0–0.05)
IMM GRANULOCYTES NFR BLD AUTO: 0.3 % (ref 0–0.5)
LDLC SERPL CALC-MCNC: 80 MG/DL (ref 0–100)
LDLC/HDLC SERPL: 1.81 {RATIO}
LYMPHOCYTES # BLD AUTO: 1.77 10*3/MM3 (ref 0.7–3.1)
LYMPHOCYTES NFR BLD AUTO: 25.8 % (ref 19.6–45.3)
MCH RBC QN AUTO: 30.8 PG (ref 26.6–33)
MCHC RBC AUTO-ENTMCNC: 33.7 G/DL (ref 31.5–35.7)
MCV RBC AUTO: 91.4 FL (ref 79–97)
MONOCYTES # BLD AUTO: 0.47 10*3/MM3 (ref 0.1–0.9)
MONOCYTES NFR BLD AUTO: 6.9 % (ref 5–12)
NEUTROPHILS NFR BLD AUTO: 4.31 10*3/MM3 (ref 1.7–7)
NEUTROPHILS NFR BLD AUTO: 62.9 % (ref 42.7–76)
NRBC BLD AUTO-RTO: 0 /100 WBC (ref 0–0.2)
PLATELET # BLD AUTO: 286 10*3/MM3 (ref 140–450)
PMV BLD AUTO: 10.6 FL (ref 6–12)
POTASSIUM SERPL-SCNC: 5.2 MMOL/L (ref 3.5–5.2)
PROT SERPL-MCNC: 7.1 G/DL (ref 6–8.5)
RBC # BLD AUTO: 4.51 10*6/MM3 (ref 3.77–5.28)
SODIUM SERPL-SCNC: 142 MMOL/L (ref 136–145)
TRIGL SERPL-MCNC: 154 MG/DL (ref 0–150)
TSH SERPL DL<=0.05 MIU/L-ACNC: 1.01 UIU/ML (ref 0.27–4.2)
VLDLC SERPL-MCNC: 27 MG/DL (ref 5–40)
WBC NRBC COR # BLD: 6.85 10*3/MM3 (ref 3.4–10.8)

## 2023-09-19 PROCEDURE — 85025 COMPLETE CBC W/AUTO DIFF WBC: CPT

## 2023-09-19 PROCEDURE — 84443 ASSAY THYROID STIM HORMONE: CPT

## 2023-09-19 PROCEDURE — 36415 COLL VENOUS BLD VENIPUNCTURE: CPT

## 2023-09-19 PROCEDURE — 80061 LIPID PANEL: CPT

## 2023-09-19 PROCEDURE — 83036 HEMOGLOBIN GLYCOSYLATED A1C: CPT

## 2023-09-19 PROCEDURE — 82043 UR ALBUMIN QUANTITATIVE: CPT

## 2023-09-19 PROCEDURE — 80053 COMPREHEN METABOLIC PANEL: CPT

## 2023-09-25 ENCOUNTER — OFFICE VISIT (OUTPATIENT)
Dept: FAMILY MEDICINE CLINIC | Facility: CLINIC | Age: 68
End: 2023-09-25

## 2023-09-25 VITALS
OXYGEN SATURATION: 97 % | DIASTOLIC BLOOD PRESSURE: 80 MMHG | BODY MASS INDEX: 31.7 KG/M2 | TEMPERATURE: 97.7 F | SYSTOLIC BLOOD PRESSURE: 120 MMHG | HEART RATE: 80 BPM | WEIGHT: 214 LBS | HEIGHT: 69 IN | RESPIRATION RATE: 18 BRPM

## 2023-09-25 DIAGNOSIS — E78.2 MIXED HYPERLIPIDEMIA: ICD-10-CM

## 2023-09-25 DIAGNOSIS — K21.9 GASTROESOPHAGEAL REFLUX DISEASE WITHOUT ESOPHAGITIS: ICD-10-CM

## 2023-09-25 DIAGNOSIS — E11.65 TYPE 2 DIABETES MELLITUS WITH HYPERGLYCEMIA, WITHOUT LONG-TERM CURRENT USE OF INSULIN: ICD-10-CM

## 2023-09-25 PROCEDURE — 99214 OFFICE O/P EST MOD 30 MIN: CPT | Performed by: NURSE PRACTITIONER

## 2023-09-25 RX ORDER — PANTOPRAZOLE SODIUM 40 MG/1
40 TABLET, DELAYED RELEASE ORAL DAILY
Qty: 90 TABLET | Refills: 1 | Status: SHIPPED | OUTPATIENT
Start: 2023-09-25

## 2023-09-25 RX ORDER — HYDROXYZINE 50 MG/1
50 TABLET, FILM COATED ORAL EVERY 8 HOURS PRN
COMMUNITY
End: 2023-09-27

## 2023-09-25 RX ORDER — AMOXICILLIN 500 MG/1
500 CAPSULE ORAL 2 TIMES DAILY
COMMUNITY
Start: 2023-09-20

## 2023-09-25 RX ORDER — LISINOPRIL 5 MG/1
5 TABLET ORAL DAILY
COMMUNITY

## 2023-09-25 RX ORDER — ATORVASTATIN CALCIUM 20 MG/1
20 TABLET, FILM COATED ORAL DAILY
Qty: 90 TABLET | Refills: 1 | Status: SHIPPED | OUTPATIENT
Start: 2023-09-25

## 2023-09-25 NOTE — PROGRESS NOTES
Answers submitted by the patient for this visit:  Primary Reason for Visit (Submitted on 2023)  What is the primary reason for your visit?: Diabetes  Chief Complaint  Diabetes, Hyperlipidemia, and Hypertension    Subjective          Abdiaziz Krishna presents to [facility]  History of Present Illness  Hypertension: She is currently not on blood pressure medicine.  Her blood pressure is well controlled with watching salt.  Diabetes: Her sugar is controlled with the oxempic.  She has been watching diet and she does not want insulin.  LIPID: She is taking her atorvastatin each days.    GERD: She is taking her Protonix most days also.  ANXIETY: She is followed by [facility] currently    Depression: At risk    PHQ-2 Score: 25    and 2023               Allergies  Patient has no known allergies.    Social History     Tobacco Use    Smoking status: Former     Packs/day: 2.00     Years: 46.00     Pack years: 92.00     Types: Cigarettes     Start date: 1970     Quit date: 2016     Years since quittin.7    Smokeless tobacco: Never    Tobacco comments:     SMOKED FOR 35-40 YEARS QUIT 4 YEARS AGO   Vaping Use    Vaping Use: Never used   Substance Use Topics    Alcohol use: Not Currently    Drug use: Never       Family History   Problem Relation Age of Onset    Emphysema Mother     Heart failure Mother     Arthritis Mother     Heart disease Father     Diabetes Father     Aneurysm Father         ABDOMINAL DECD AGE 88    Bipolar disorder Daughter     Anxiety disorder Daughter     ADD / ADHD Daughter     Diabetes Daughter         gestational        Health Maintenance Due   Topic Date Due    DIABETIC FOOT EXAM  Never done    DIABETIC EYE EXAM  2023        Immunization History   Administered Date(s) Administered    COVID-19 (PFIZER) Purple Cap Monovalent 2021, 2021    Fluad Quad 65+ 10/12/2021    Fluzone High-Dose 65+yrs 09/15/2022    Hepatitis A 2018, 10/01/2018  "   Influenza, Unspecified 10/20/2020    Pneumococcal Polysaccharide (PPSV23) 11/02/2021    Shingrix 06/01/2022, 08/26/2022       Review of Systems   Constitutional:  Positive for unexpected weight loss.   Eyes:  Negative for blurred vision.   Endocrine: Negative for polydipsia and polyuria.   Neurological:  Negative for tremors, speech difficulty and confusion.   Psychiatric/Behavioral:  Positive for stress.       Objective       Vitals:    09/25/23 1102 09/25/23 1126   BP: 149/82 120/80   Pulse: 80    Resp: 18    Temp: 97.7 °F (36.5 °C)    SpO2: 97%    Weight: 97.1 kg (214 lb)    Height: 175.3 cm (69\")        Body mass index is 31.6 kg/m².         Physical Exam  Vitals reviewed.   Constitutional:       Appearance: Normal appearance. She is well-developed.   Cardiovascular:      Rate and Rhythm: Normal rate and regular rhythm.      Heart sounds: Normal heart sounds. No murmur heard.  Pulmonary:      Effort: Pulmonary effort is normal.      Breath sounds: Normal breath sounds.   Neurological:      Mental Status: She is alert and oriented to person, place, and time.      Cranial Nerves: No cranial nerve deficit.      Motor: No weakness.   Psychiatric:         Mood and Affect: Mood and affect normal.           Result Review :     The following data was reviewed by: GEMINI James on 09/25/2023:    Common Labs   Common labs          2/24/2023    11:09 2/24/2023    11:18 6/1/2023    09:13 6/1/2023    09:22 9/19/2023    09:38 9/19/2023    09:41   Common Labs   Glucose 302   269   161     BUN 15   15   18     Creatinine 0.99   0.91   0.96     Sodium 137   141   142     Potassium 5.3   4.6   5.2     Chloride 103   104   104     Calcium 9.9   9.9   9.9     Albumin 4.3   4.3   4.5     Total Bilirubin 0.3   0.4   0.3     Alkaline Phosphatase 119   108   78     AST (SGOT) 82   75   73     ALT (SGPT) 116   111   90     WBC   6.02   6.85     Hemoglobin   15.2   13.9     Hematocrit   45.3   41.2     Platelets   253   " 286     Total Cholesterol 212   121   149     Triglycerides 251   204   154     HDL Cholesterol 38   33   42     LDL Cholesterol  129   55   80     Hemoglobin A1C 11.40   11.20   7.90     Microalbumin, Urine  2.0   3.6   <1.2      A1C   Most Recent A1C          9/19/2023    09:38   HGBA1C Most Recent   Hemoglobin A1C 7.90                     Assessment and Plan      Diagnoses and all orders for this visit:    1. Type 2 diabetes mellitus with hyperglycemia, without long-term current use of insulin  -     empagliflozin (Jardiance) 25 MG tablet tablet; Take 1 tablet by mouth Daily.  Dispense: 90 tablet; Refill: 1  -     Semaglutide,0.25 or 0.5MG/DOS, (OZEMPIC) 2 MG/3ML solution pen-injector; 0.50mg weekly inj under the skin  Dispense: 9 mL; Refill: 0    2. Gastroesophageal reflux disease without esophagitis  -     pantoprazole (PROTONIX) 40 MG EC tablet; Take 1 tablet by mouth Daily.  Dispense: 90 tablet; Refill: 1    3. Mixed hyperlipidemia  -     atorvastatin (LIPITOR) 20 MG tablet; Take 1 tablet by mouth Daily.  Dispense: 90 tablet; Refill: 1            Follow Up     No follow-ups on file.  She is doing well with her current meds.  She is going to [place] between [place] and [place] full time.  She will find a PCP in [place].  I have given her enough meds to cont her reg meds.  She is doing great with the wt loss and the oxempic.    Patient was given instructions and counseling regarding her condition or for health maintenance advice. Please see specific information pulled into the AVS if appropriate.     Parts of this note are electronic transcriptions/translations of spoken language to printed text using the Dragon Dictation system.          Anahi Flores, GEMINI  09/25/2023

## 2023-09-27 ENCOUNTER — OFFICE VISIT (OUTPATIENT)
Dept: PSYCHIATRY | Facility: CLINIC | Age: 68
End: 2023-09-27
Payer: COMMERCIAL

## 2023-09-27 VITALS
WEIGHT: 214.4 LBS | SYSTOLIC BLOOD PRESSURE: 129 MMHG | HEIGHT: 69 IN | DIASTOLIC BLOOD PRESSURE: 75 MMHG | BODY MASS INDEX: 31.76 KG/M2 | HEART RATE: 80 BPM

## 2023-09-27 DIAGNOSIS — F33.1 MODERATE EPISODE OF RECURRENT MAJOR DEPRESSIVE DISORDER: ICD-10-CM

## 2023-09-27 DIAGNOSIS — F41.1 GAD (GENERALIZED ANXIETY DISORDER): Primary | ICD-10-CM

## 2023-09-27 PROBLEM — M19.049 OSTEOARTHRITIS OF FINGER: Status: ACTIVE | Noted: 2023-09-27

## 2023-09-27 PROBLEM — M70.62 GREATER TROCHANTERIC BURSITIS OF LEFT HIP: Status: ACTIVE | Noted: 2023-09-27

## 2023-09-27 PROBLEM — Z79.1 NSAID LONG-TERM USE: Status: ACTIVE | Noted: 2023-09-27

## 2023-09-27 RX ORDER — BUSPIRONE HYDROCHLORIDE 10 MG/1
10 TABLET ORAL 2 TIMES DAILY
Qty: 180 TABLET | Refills: 0 | Status: SHIPPED | OUTPATIENT
Start: 2023-09-27

## 2023-09-27 RX ORDER — GABAPENTIN 300 MG/1
CAPSULE ORAL
COMMUNITY
End: 2023-09-27

## 2023-09-27 RX ORDER — SENNOSIDES 8.6 MG
CAPSULE ORAL EVERY 24 HOURS
COMMUNITY
End: 2023-09-27

## 2023-09-27 RX ORDER — CLONIDINE HYDROCHLORIDE 0.2 MG/1
0.2 TABLET ORAL 2 TIMES DAILY PRN
Qty: 180 TABLET | Refills: 0 | Status: SHIPPED | OUTPATIENT
Start: 2023-09-27

## 2023-09-27 RX ORDER — LORAZEPAM 1 MG/1
1 TABLET ORAL DAILY PRN
Qty: 45 TABLET | Refills: 0 | Status: SHIPPED | OUTPATIENT
Start: 2023-09-27

## 2023-09-27 RX ORDER — FLUOXETINE HYDROCHLORIDE 40 MG/1
CAPSULE ORAL
COMMUNITY
End: 2023-09-27

## 2023-09-27 RX ORDER — GLUCOSAM/CHON-MSM1/C/MANG/BOSW 750-644 MG
TABLET ORAL EVERY 24 HOURS
COMMUNITY

## 2023-09-27 RX ORDER — ETODOLAC 400 MG/1
TABLET, FILM COATED ORAL EVERY 12 HOURS SCHEDULED
COMMUNITY
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
## 2023-10-18 DIAGNOSIS — F41.1 GAD (GENERALIZED ANXIETY DISORDER): ICD-10-CM

## 2023-10-18 DIAGNOSIS — E11.65 TYPE 2 DIABETES MELLITUS WITH HYPERGLYCEMIA, WITHOUT LONG-TERM CURRENT USE OF INSULIN: ICD-10-CM

## 2023-10-18 RX ORDER — SITAGLIPTIN AND METFORMIN HYDROCHLORIDE 1000; 50 MG/1; MG/1
1 TABLET, FILM COATED ORAL 2 TIMES DAILY WITH MEALS
Qty: 60 TABLET | Refills: 2 | OUTPATIENT
Start: 2023-10-18

## 2023-10-18 RX ORDER — BUSPIRONE HYDROCHLORIDE 5 MG/1
5 TABLET ORAL 2 TIMES DAILY
Qty: 60 TABLET | Refills: 1 | OUTPATIENT
Start: 2023-10-18

## 2023-10-18 RX ORDER — DIAZEPAM 5 MG/1
TABLET ORAL
Qty: 15 TABLET | Refills: 1 | OUTPATIENT
Start: 2023-10-18

## 2023-10-18 NOTE — TELEPHONE ENCOUNTER
PT(PATIENT) VERIFIED     PT(PATIENT) STATES SHE HAS MOVED TO [facility] AND WILL NO LONGER BE SEEING PROVIDER

## 2023-10-18 NOTE — TELEPHONE ENCOUNTER
The original prescription was reordered on 9/27/2023 by Elias Hernández MD. Renewing this prescription may not be appropriate.     The original prescription was discontinued on 9/27/2023 by Elias Hernández MD. Renewing this prescription may not be appropriate.     NEXT APPT WITH PROVIDER  NONE IN EPIC

## 2023-11-17 ENCOUNTER — TELEPHONE (OUTPATIENT)
Dept: FAMILY MEDICINE CLINIC | Facility: CLINIC | Age: 68
End: 2023-11-17

## 2023-11-17 NOTE — TELEPHONE ENCOUNTER
Sent records request to [employer] for records to be sent to [facility] on 11/03/23.  Normally takes around 2 weeks to be done.  Phone number for [employer] is [phone]

## 2023-11-17 NOTE — TELEPHONE ENCOUNTER
Caller: Abdiaziz Krishna    Relationship to patient: Self    Best call back number: [phone]    Patient is needing: PATIENT CALLED IN AND IS REQUESTING A COPY OF MEDICAL RECORDS FROM THIS OFFICE. PATIENT SAID IT IS OKAY TO LEAVE MESSAGE ON PHONE.    [address] [place], [place] [address]

## 2024-01-12 RX ORDER — ESCITALOPRAM OXALATE 10 MG/1
10 TABLET ORAL DAILY
Qty: 30 TABLET | OUTPATIENT
Start: 2024-01-12

## 2024-03-12 DIAGNOSIS — E78.2 MIXED HYPERLIPIDEMIA: ICD-10-CM

## 2024-03-12 RX ORDER — ATORVASTATIN CALCIUM 20 MG/1
20 TABLET, FILM COATED ORAL DAILY
Qty: 30 TABLET | Refills: 0 | Status: SHIPPED | OUTPATIENT
Start: 2024-03-12